# Patient Record
Sex: FEMALE | Race: WHITE | NOT HISPANIC OR LATINO | Employment: FULL TIME | URBAN - METROPOLITAN AREA
[De-identification: names, ages, dates, MRNs, and addresses within clinical notes are randomized per-mention and may not be internally consistent; named-entity substitution may affect disease eponyms.]

---

## 2017-05-05 RX ORDER — ALPRAZOLAM 0.25 MG/1
TABLET ORAL
Qty: 30 TABLET | Refills: 0 | Status: SHIPPED | OUTPATIENT
Start: 2017-05-05 | End: 2017-08-25 | Stop reason: SDUPTHER

## 2017-06-26 RX ORDER — SIMVASTATIN 20 MG
TABLET ORAL
Qty: 30 TABLET | Refills: 1 | Status: SHIPPED | OUTPATIENT
Start: 2017-06-26 | End: 2017-08-21 | Stop reason: SDUPTHER

## 2017-07-11 RX ORDER — VALACYCLOVIR HYDROCHLORIDE 1 G/1
TABLET, FILM COATED ORAL
Qty: 30 TABLET | Refills: 0 | Status: SHIPPED | OUTPATIENT
Start: 2017-07-11 | End: 2018-10-03 | Stop reason: SDUPTHER

## 2017-07-25 ENCOUNTER — TRANSCRIBE ORDERS (OUTPATIENT)
Dept: ADMINISTRATIVE | Facility: HOSPITAL | Age: 57
End: 2017-07-25

## 2017-07-25 DIAGNOSIS — Z12.31 VISIT FOR SCREENING MAMMOGRAM: Primary | ICD-10-CM

## 2017-07-28 ENCOUNTER — HOSPITAL ENCOUNTER (OUTPATIENT)
Dept: MAMMOGRAPHY | Facility: HOSPITAL | Age: 57
Discharge: HOME | End: 2017-07-28
Admitting: INTERNAL MEDICINE

## 2017-07-28 DIAGNOSIS — Z12.31 VISIT FOR SCREENING MAMMOGRAM: ICD-10-CM

## 2017-07-28 PROCEDURE — G0202 SCR MAMMO BI INCL CAD: HCPCS

## 2017-08-18 DIAGNOSIS — Z00.00 HEALTH CARE MAINTENANCE: Primary | ICD-10-CM

## 2017-08-18 DIAGNOSIS — E78.5 HYPERLIPIDEMIA, UNSPECIFIED HYPERLIPIDEMIA TYPE: ICD-10-CM

## 2017-08-19 LAB
25(OH)D3+25(OH)D2 SERPL-MCNC: 40.4 NG/ML (ref 30–100)
APPEARANCE UR: CLEAR
BACTERIA #/AREA URNS HPF: NORMAL /HPF
BASOPHILS # BLD AUTO: 0.03 10*3/MM3 (ref 0–0.2)
BASOPHILS NFR BLD AUTO: 0.5 % (ref 0–1.5)
BILIRUB UR QL STRIP: NEGATIVE
CHOLEST SERPL-MCNC: 223 MG/DL (ref 0–200)
COLOR UR: YELLOW
EOSINOPHIL # BLD AUTO: 0.07 10*3/MM3 (ref 0–0.7)
EOSINOPHIL NFR BLD AUTO: 1.2 % (ref 0.3–6.2)
EPI CELLS #/AREA URNS HPF: NORMAL /HPF
ERYTHROCYTE [DISTWIDTH] IN BLOOD BY AUTOMATED COUNT: 13.1 % (ref 11.7–13)
GLUCOSE UR QL: NEGATIVE
HCT VFR BLD AUTO: 41.2 % (ref 35.6–45.5)
HDLC SERPL-MCNC: 107 MG/DL (ref 40–60)
HGB BLD-MCNC: 13.8 G/DL (ref 11.9–15.5)
HGB UR QL STRIP: NEGATIVE
IMM GRANULOCYTES # BLD: 0.02 10*3/MM3 (ref 0–0.03)
IMM GRANULOCYTES NFR BLD: 0.4 % (ref 0–0.5)
KETONES UR QL STRIP: NEGATIVE
LDLC SERPL CALC-MCNC: 83 MG/DL (ref 0–100)
LEUKOCYTE ESTERASE UR QL STRIP: NEGATIVE
LYMPHOCYTES # BLD AUTO: 2.29 10*3/MM3 (ref 0.9–4.8)
LYMPHOCYTES NFR BLD AUTO: 40.2 % (ref 19.6–45.3)
MCH RBC QN AUTO: 35.1 PG (ref 26.9–32)
MCHC RBC AUTO-ENTMCNC: 33.5 G/DL (ref 32.4–36.3)
MCV RBC AUTO: 104.8 FL (ref 80.5–98.2)
MICRO URNS: NORMAL
MICRO URNS: NORMAL
MONOCYTES # BLD AUTO: 0.48 10*3/MM3 (ref 0.2–1.2)
MONOCYTES NFR BLD AUTO: 8.4 % (ref 5–12)
MUCOUS THREADS URNS QL MICRO: PRESENT /HPF
NEUTROPHILS # BLD AUTO: 2.81 10*3/MM3 (ref 1.9–8.1)
NEUTROPHILS NFR BLD AUTO: 49.3 % (ref 42.7–76)
NITRITE UR QL STRIP: NEGATIVE
PH UR STRIP: 5.5 [PH] (ref 5–7.5)
PLATELET # BLD AUTO: 180 10*3/MM3 (ref 140–500)
PROT UR QL STRIP: NEGATIVE
RBC # BLD AUTO: 3.93 10*6/MM3 (ref 3.9–5.2)
RBC #/AREA URNS HPF: NORMAL /HPF
SP GR UR: 1.02 (ref 1–1.03)
TRIGL SERPL-MCNC: 166 MG/DL (ref 0–150)
TSH SERPL DL<=0.005 MIU/L-ACNC: 2.89 MIU/ML (ref 0.27–4.2)
URINALYSIS REFLEX: NORMAL
UROBILINOGEN UR STRIP-MCNC: 0.2 MG/DL (ref 0.2–1)
VLDLC SERPL CALC-MCNC: 33.2 MG/DL (ref 5–40)
WBC # BLD AUTO: 5.7 10*3/MM3 (ref 4.5–10.7)
WBC #/AREA URNS HPF: NORMAL /HPF

## 2017-08-21 RX ORDER — SIMVASTATIN 20 MG
TABLET ORAL
Qty: 30 TABLET | Refills: 0 | Status: SHIPPED | OUTPATIENT
Start: 2017-08-21 | End: 2017-09-30 | Stop reason: SDUPTHER

## 2017-08-25 ENCOUNTER — OFFICE VISIT (OUTPATIENT)
Dept: INTERNAL MEDICINE | Facility: CLINIC | Age: 57
End: 2017-08-25

## 2017-08-25 VITALS
HEART RATE: 78 BPM | OXYGEN SATURATION: 98 % | SYSTOLIC BLOOD PRESSURE: 130 MMHG | HEIGHT: 64 IN | BODY MASS INDEX: 23.9 KG/M2 | WEIGHT: 140 LBS | DIASTOLIC BLOOD PRESSURE: 84 MMHG

## 2017-08-25 DIAGNOSIS — M53.3 COCCYDYNIA: ICD-10-CM

## 2017-08-25 DIAGNOSIS — E78.5 HYPERLIPIDEMIA, UNSPECIFIED HYPERLIPIDEMIA TYPE: ICD-10-CM

## 2017-08-25 DIAGNOSIS — Z00.00 WELL ADULT EXAM: Primary | ICD-10-CM

## 2017-08-25 LAB
ALBUMIN SERPL-MCNC: 4.8 G/DL (ref 3.5–5.2)
ALBUMIN/GLOB SERPL: 1.8 G/DL
ALP SERPL-CCNC: 62 U/L (ref 39–117)
ALT SERPL-CCNC: 56 U/L (ref 1–33)
AST SERPL-CCNC: 49 U/L (ref 1–32)
BILIRUB SERPL-MCNC: 0.5 MG/DL (ref 0.1–1.2)
BUN SERPL-MCNC: 16 MG/DL (ref 6–20)
BUN/CREAT SERPL: 27.1 (ref 7–25)
CALCIUM SERPL-MCNC: 9.7 MG/DL (ref 8.6–10.5)
CHLORIDE SERPL-SCNC: 100 MMOL/L (ref 98–107)
CO2 SERPL-SCNC: 27.4 MMOL/L (ref 22–29)
CREAT SERPL-MCNC: 0.59 MG/DL (ref 0.57–1)
GLOBULIN SER CALC-MCNC: 2.7 GM/DL
GLUCOSE SERPL-MCNC: 96 MG/DL (ref 65–99)
POTASSIUM SERPL-SCNC: 4.4 MMOL/L (ref 3.5–5.2)
PROT SERPL-MCNC: 7.5 G/DL (ref 6–8.5)
SODIUM SERPL-SCNC: 144 MMOL/L (ref 136–145)

## 2017-08-25 PROCEDURE — 72220 X-RAY EXAM SACRUM TAILBONE: CPT | Performed by: INTERNAL MEDICINE

## 2017-08-25 PROCEDURE — 93000 ELECTROCARDIOGRAM COMPLETE: CPT | Performed by: INTERNAL MEDICINE

## 2017-08-25 PROCEDURE — 99214 OFFICE O/P EST MOD 30 MIN: CPT | Performed by: INTERNAL MEDICINE

## 2017-08-25 RX ORDER — ALPRAZOLAM 0.25 MG/1
0.25 TABLET ORAL DAILY PRN
Qty: 30 TABLET | Refills: 0 | Status: SHIPPED | OUTPATIENT
Start: 2017-08-25 | End: 2017-09-24

## 2017-08-25 NOTE — PROGRESS NOTES
Subjective     Sammi Stein is a 57 y.o. female who presents for a complete physical exam.      History of Present Illness     HLD.  Good control with current regimen.   C/o LBP.  Tailbone area.  Going on 2-3 months. Hurts to sit/stand.  Exercise helps.  Ibuprofen helpful.  No injury.    GERD.  She is followed by Dr. Shafer.    IBS.  Anxiety bothers her GI tract.   Anxiety.  Rare use of Xanax is helpful.      Review of Systems   Constitutional: Negative.    HENT: Negative.    Eyes: Negative.    Respiratory: Negative.    Cardiovascular: Negative.    Gastrointestinal:        Symptoms of IBS   Endocrine: Negative.    Genitourinary: Negative.    Musculoskeletal: Negative.    Skin: Negative.    Allergic/Immunologic: Negative.    Neurological: Negative.    Hematological: Negative.    Psychiatric/Behavioral: The patient is nervous/anxious.        The following portions of the patient's history were reviewed and updated as appropriate: allergies, current medications, past family history, past medical history, past social history, past surgical history and problem list.  Health maintenance tab was reviewed and updated with the patient.       Patient Active Problem List    Diagnosis Date Noted   • Panic attack 01/22/2016     Note Last Updated: 8/16/2016     Description: Rare use of Xanax.  Contract on 8/16/2016     • Irritable bowel 01/22/2016   • HLD (hyperlipidemia) 01/22/2016   • Gastroesophageal reflux disease with esophagitis 01/22/2016       Past Medical History:   Diagnosis Date   • Abnormal EKG     Anterior T wave changes at baseline   • Abnormal liver function test    • Blood tests for routine general physical examination    • Gastritis    • Gastritis    • GERD (gastroesophageal reflux disease)    • Herpes labialis    • Herpes labialis    • Hyperlipidemia    • IBS (irritable bowel syndrome)    • Panic attack     Rare use of Xanax.  Contract on 8/6/14   • SVT (supraventricular tachycardia)     Holter on 8/23/16 with  several brief runs of SVT of up to 7 beats       Past Surgical History:   Procedure Laterality Date   • BLADDER SURGERY     • CHOLECYSTECTOMY     • SALPINGO OOPHORECTOMY     • TOTAL ABDOMINAL HYSTERECTOMY  1992    done for fibroids   • TOTAL ABDOMINAL HYSTERECTOMY WITH SALPINGO OOPHORECTOMY  1992       Family History   Problem Relation Age of Onset   • Diabetes Father    • Heart attack Father      Father with fatal MI at 76   • Diabetes Sister      type 2, two sisters   • Rectal cancer Brother    • Rheumatic fever Brother    • Diabetes Sister    • Stroke Sister    • Breast cancer Maternal Aunt      late 70's       Social History     Social History   • Marital status:      Spouse name: N/A   • Number of children: N/A   • Years of education: N/A     Occupational History   • Not on file.     Social History Main Topics   • Smoking status: Never Smoker   • Smokeless tobacco: Never Used   • Alcohol use Yes      Comment: Socially    • Drug use: No   • Sexual activity: Not on file     Other Topics Concern   • Not on file     Social History Narrative       Current Outpatient Prescriptions on File Prior to Visit   Medication Sig Dispense Refill   • hyoscyamine (ANASPAZ,LEVSIN) 0.125 MG tablet Take by mouth. Take 1 tablet 3-4 times daily as needed     • omeprazole (PriLOSEC) 40 MG capsule Take 1 capsule by mouth Daily.     • simvastatin (ZOCOR) 20 MG tablet TAKE ONE TABLET BY MOUTH DAILY 30 tablet 0   • traZODone (DESYREL) 50 MG tablet TAKE ONE-HALF TO ONE TABLET BY MOUTH EVERY NIGHT AT BEDTIME 90 tablet 10   • valACYclovir (VALTREX) 1000 MG tablet TAKE TWO TABLETS BY MOUTH EVERY 12 HOURS AS NEEDED 30 tablet 0   • [DISCONTINUED] ALPRAZolam (XANAX) 0.25 MG tablet TAKE ONE TABLET BY MOUTH EVERY 6 TO 8 HOURS AS NEEDED 30 tablet 0     No current facility-administered medications on file prior to visit.        Allergies   Allergen Reactions   • Codeine Nausea Only       Immunization History   Administered Date(s)  "Administered   • HPV Quadrivalent 10/02/2006, 12/29/2006   • Tdap 08/16/2016       Objective     /84  Pulse 78  Ht 64\" (162.6 cm)  Wt 140 lb (63.5 kg)  LMP  (Approximate)  SpO2 98%  BMI 24.03 kg/m2    Physical Exam   Constitutional: She is oriented to person, place, and time. She appears well-developed and well-nourished.   HENT:   Head: Normocephalic and atraumatic.   Right Ear: Hearing, tympanic membrane and external ear normal.   Left Ear: Hearing, tympanic membrane and external ear normal.   Nose: Nose normal.   Mouth/Throat: Oropharynx is clear and moist.   Neck: Neck supple. No thyromegaly present.   Cardiovascular: Normal rate, regular rhythm and normal heart sounds.    No murmur heard.  Pulmonary/Chest: Effort normal and breath sounds normal. Right breast exhibits no mass. Left breast exhibits no mass.   Abdominal: Soft. She exhibits no distension. There is no hepatosplenomegaly. There is no tenderness.   Genitourinary: No breast tenderness.   Musculoskeletal:        Lumbar back: She exhibits no tenderness, no bony tenderness, no swelling and no edema.   Lymphadenopathy:     She has no cervical adenopathy.   Neurological: She is alert and oriented to person, place, and time.   Skin: Skin is warm and dry.   Psychiatric: She has a normal mood and affect.         ECG 12 Lead  Date/Time: 8/25/2017 10:36 AM  Performed by: JF ABAD  Authorized by: JF ABAD   Comparison: compared with previous ECG from 8/23/2016  Similar to previous ECG  Rhythm: sinus rhythm  Rate: normal  Conduction: conduction normal  ST Segments: ST segments normal  T flattening: V1, V2, V3 and III  QRS axis: normal  Clinical impression: non-specific ECG           X-rays of the coccyx  performed today for following indication:   pain.  X-ray reveal nad.  There is no available x-ray for comparison.  X-ray sent to radiology for official interpretation and findings.        Assessment/Plan   Sammi was seen today for " annual exam.    Diagnoses and all orders for this visit:    Well adult exam  -     ECG 12 Lead    Hyperlipidemia, unspecified hyperlipidemia type  -     Comprehensive Metabolic Panel    Coccydynia  -     XR Sacrum & Coccyx (In Office)    Other orders  -     ALPRAZolam (XANAX) 0.25 MG tablet; Take 1 tablet by mouth Daily As Needed for Anxiety for up to 30 days.        Discussion    Patient presents today for a CPE.      Patient follows a healthy diet.   Patient follows an adequate exercise regimen. Mammogram is up to date.   Colon cancer screening is up to date.   Pap smear no longer performed secondary to hysterectomy.   Immunizations are up to date.   DEXA is up to date.    Anxiety.  Rx rare use of Xanax.  Update contract.  Offered Lexapro.    HLD.  Continue current regimen.    Coccydynia.  I recommend to read [facility] article for tips on conservative management.  Let me know if continued bother.      Health Maintenance   Topic Date Due   • INFLUENZA VACCINE  09/01/2017   • LIPID PANEL  08/18/2018   • MAMMOGRAM  07/28/2019   • COLONOSCOPY  03/26/2020   • TDAP/TD VACCINES (2 - Td) 08/16/2026   • HEPATITIS C SCREENING  Addressed   • PAP SMEAR  Excluded            Future Appointments  Date Time Provider Department Center   8/24/2018 9:00 AM [facility] None   8/31/2018 10:00 AM Christine Morris MD [facility] None

## 2017-09-26 RX ORDER — TRAZODONE HYDROCHLORIDE 50 MG/1
TABLET ORAL
Qty: 30 TABLET | Refills: 9 | Status: SHIPPED | OUTPATIENT
Start: 2017-09-26 | End: 2018-10-27 | Stop reason: SDUPTHER

## 2017-10-02 RX ORDER — SIMVASTATIN 20 MG
TABLET ORAL
Qty: 30 TABLET | Refills: 3 | Status: SHIPPED | OUTPATIENT
Start: 2017-10-02 | End: 2018-02-04 | Stop reason: SDUPTHER

## 2018-02-05 RX ORDER — SIMVASTATIN 20 MG
TABLET ORAL
Qty: 30 TABLET | Refills: 2 | Status: SHIPPED | OUTPATIENT
Start: 2018-02-05 | End: 2018-05-26 | Stop reason: SDUPTHER

## 2018-03-06 ENCOUNTER — OFFICE (OUTPATIENT)
Dept: URBAN - METROPOLITAN AREA OTHER 6 | Facility: OTHER | Age: 58
End: 2018-03-06

## 2018-03-06 VITALS
HEIGHT: 64 IN | WEIGHT: 146 LBS | SYSTOLIC BLOOD PRESSURE: 130 MMHG | HEART RATE: 72 BPM | DIASTOLIC BLOOD PRESSURE: 84 MMHG

## 2018-03-06 DIAGNOSIS — K58.9 IRRITABLE BOWEL SYNDROME WITHOUT DIARRHEA: ICD-10-CM

## 2018-03-06 DIAGNOSIS — K21.9 GASTRO-ESOPHAGEAL REFLUX DISEASE WITHOUT ESOPHAGITIS: ICD-10-CM

## 2018-03-06 DIAGNOSIS — Z80.0 FAMILY HISTORY OF MALIGNANT NEOPLASM OF DIGESTIVE ORGANS: ICD-10-CM

## 2018-03-06 PROCEDURE — 99212 OFFICE O/P EST SF 10 MIN: CPT | Performed by: INTERNAL MEDICINE

## 2018-05-29 RX ORDER — SIMVASTATIN 20 MG
TABLET ORAL
Qty: 30 TABLET | Refills: 1 | Status: SHIPPED | OUTPATIENT
Start: 2018-05-29 | End: 2018-08-04 | Stop reason: SDUPTHER

## 2018-06-05 RX ORDER — ALPRAZOLAM 0.25 MG/1
TABLET ORAL
Qty: 30 TABLET | Refills: 0 | Status: SHIPPED | OUTPATIENT
Start: 2018-06-05 | End: 2018-09-10 | Stop reason: SDUPTHER

## 2018-08-04 RX ORDER — SIMVASTATIN 20 MG
TABLET ORAL
Qty: 30 TABLET | Refills: 0 | Status: SHIPPED | OUTPATIENT
Start: 2018-08-04 | End: 2018-09-07 | Stop reason: SDUPTHER

## 2018-08-06 ENCOUNTER — TRANSCRIBE ORDERS (OUTPATIENT)
Dept: ADMINISTRATIVE | Facility: HOSPITAL | Age: 58
End: 2018-08-06

## 2018-08-06 DIAGNOSIS — Z12.31 VISIT FOR SCREENING MAMMOGRAM: Primary | ICD-10-CM

## 2018-08-19 ENCOUNTER — OFFICE VISIT (OUTPATIENT)
Dept: RETAIL CLINIC | Facility: CLINIC | Age: 58
End: 2018-08-19

## 2018-08-19 VITALS
HEART RATE: 81 BPM | OXYGEN SATURATION: 98 % | SYSTOLIC BLOOD PRESSURE: 144 MMHG | DIASTOLIC BLOOD PRESSURE: 80 MMHG | TEMPERATURE: 98.3 F | RESPIRATION RATE: 20 BRPM
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
## 2018-08-19 PROCEDURE — 99213 OFFICE O/P EST LOW 20 MIN: CPT | Performed by: NURSE PRACTITIONER

## 2018-08-19 RX ORDER — TRIAMCINOLONE ACETONIDE 1 MG/G
CREAM TOPICAL 2 TIMES DAILY
Qty: 60 G | Refills: 0 | Status: SHIPPED | OUTPATIENT
Start: 2018-08-19 | End: 2018-08-29

## 2018-08-19 NOTE — PATIENT INSTRUCTIONS
Insect Bite, Adult  An insect bite can make your skin red, itchy, and swollen. An insect bite is different from an insect sting, which happens when an insect injects poison (venom) into the skin.  Some insects can spread disease to people through a bite. However, most insect bites do not lead to disease and are not serious.  What are the causes?  Insects may bite for a variety of reasons, including:  · Hunger.  · To defend themselves.    Insects that bite include:  · Spiders.  · Mosquitoes.  · Ticks.  · Fleas.  · Ants.  · Flies.  · Bedbugs.    What are the signs or symptoms?  Symptoms of this condition include:  · Itching or pain in the bite area.  · Redness and swelling in the bite area.  · An open wound (skin ulcer).    In many cases, symptoms last for 2-4 days.  How is this diagnosed?  This condition is usually diagnosed based on symptoms and a physical exam.  How is this treated?  Treatment is usually not needed. Symptoms often go away on their own. When treatment is recommended, it may involve:  · Applying a cream or lotion to the bitten area. This treatment helps with itching.  · Taking an antibiotic medicine. This treatment is needed if the bite area gets infected.  · Getting a tetanus shot.  · Applying ice to the affected area.  · Medicines called antihistamines. This treatment is needed if you develop an allergic reaction to the insect bite.    Follow these instructions at home:  Bite area care  · Do not scratch the bite area.  · Keep the bite area clean and dry. Wash it every day with soap and water as told by your health care provider.  · Check the bite area every day for signs of infection. Check for:  ? More redness, swelling, or pain.  ? Fluid or blood.  ? Warmth.  ? Pus.  Managing pain, itching, and swelling    · You may apply a baking soda paste, cortisone cream, or calamine lotion to the bite area as told by your health care provider.  · If directed, apply ice to the bite area.  ? Put ice in a  plastic bag.  ? Place a towel between your skin and the bag.  ? Leave the ice on for 20 minutes, 2-3 times per day.  Medicines  · Apply or take over-the-counter and prescription medicines only as told by your health care provider.  · If you were prescribed an antibiotic medicine, use it as told by your health care provider. Do not stop using the antibiotic even if your condition improves.  General instructions  · Keep all follow-up visits as told by your health care provider. This is important.  How is this prevented?  To help reduce your risk of insect bites:  · When you are outdoors, wear clothing that covers your arms and legs.  · Use insect repellent. The best insect repellents contain:  ? DEET, picaridin, oil of lemon eucalyptus (OLE), or RT4242.  ? Higher amounts of an active ingredient.  · If your home windows do not have screens, consider installing them.    Contact a health care provider if:  · You have more redness, swelling, or pain in the bite area.  · You have fluid, blood, or pus coming from the bite area.  · The bite area feels warm to the touch.  · You have a fever.  Get help right away if:  · You have joint pain.  · You have a rash.  · You have shortness of breath.  · You feel unusually tired or sleepy.  · You have neck pain.  · You have a headache.  · You have unusual weakness.  · You have chest pain.  · You have nausea, vomiting, or pain in the abdomen.  This information is not intended to replace advice given to you by your health care provider. Make sure you discuss any questions you have with your health care provider.  Document Released: 01/25/2006 Document Revised: 08/16/2017 Document Reviewed: 06/26/2017  "[employer]" Interactive Patient Education © 2018 [employer]

## 2018-08-19 NOTE — PROGRESS NOTES
Subjective   Sammi Stein is a 58 y.o. female.     Insect Bite   This is a new problem. Episode onset: 4 days ago. The problem has been unchanged. Associated symptoms include a rash. Pertinent negatives include no change in bowel habit, chest pain, chills, coughing, diaphoresis, fatigue, fever, headaches, myalgias, nausea, swollen glands, vertigo, visual change, vomiting or weakness. Nothing aggravates the symptoms. Treatments tried: cortisone cream. The treatment provided mild relief.       The following portions of the patient's history were reviewed and updated as appropriate: allergies, current medications, past medical history, past social history, past surgical history and problem list.    Review of Systems   Constitutional: Negative for chills, diaphoresis, fatigue and fever.   HENT: Negative for trouble swallowing.    Eyes: Negative for redness, itching and visual disturbance.   Respiratory: Negative for cough, choking, chest tightness, shortness of breath, wheezing and stridor.    Cardiovascular: Negative for chest pain.   Gastrointestinal: Negative for change in bowel habit, diarrhea, nausea and vomiting.   Musculoskeletal: Negative for myalgias.   Skin: Positive for rash.   Allergic/Immunologic: Positive for environmental allergies. Negative for food allergies and immunocompromised state.   Neurological: Negative for dizziness, vertigo, weakness and headaches.       Objective   Physical Exam   Constitutional: She is oriented to person, place, and time. She appears well-developed and well-nourished. She is cooperative. She does not appear ill. No distress.   Cardiovascular: Normal rate, regular rhythm, S1 normal and S2 normal.    Pulmonary/Chest: Effort normal and breath sounds normal.   Neurological: She is alert and oriented to person, place, and time.   Skin: Rash noted. Rash is papular. She is not diaphoretic.        Vitals reviewed.      Assessment/Plan   Sammi was seen today for insect  bite.    Diagnoses and all orders for this visit:    Insect bite, initial encounter  -     triamcinolone (KENALOG) 0.1 % cream; Apply  topically to the appropriate area as directed 2 (Two) Times a Day for 10 days.          -     Follow up with PCP for persistent symptoms or UC/ER for worsening/new symptoms

## 2018-08-21 ENCOUNTER — APPOINTMENT (OUTPATIENT)
Dept: MAMMOGRAPHY | Facility: HOSPITAL | Age: 58
End: 2018-08-21

## 2018-08-22 DIAGNOSIS — Z00.00 LABORATORY TESTS ORDERED AS PART OF A COMPLETE PHYSICAL EXAM (CPE): Primary | ICD-10-CM

## 2018-08-24 LAB
ALBUMIN SERPL-MCNC: 4.7 G/DL (ref 3.5–5.2)
ALBUMIN/GLOB SERPL: 1.7 G/DL
ALP SERPL-CCNC: 66 U/L (ref 39–117)
ALT SERPL-CCNC: 34 U/L (ref 1–33)
APPEARANCE UR: CLEAR
AST SERPL-CCNC: 32 U/L (ref 1–32)
BACTERIA #/AREA URNS HPF: ABNORMAL /HPF
BASOPHILS # BLD AUTO: 0.03 10*3/MM3 (ref 0–0.2)
BASOPHILS NFR BLD AUTO: 0.5 % (ref 0–1.5)
BILIRUB SERPL-MCNC: 0.5 MG/DL (ref 0.1–1.2)
BILIRUB UR QL STRIP: NEGATIVE
BUN SERPL-MCNC: 12 MG/DL (ref 8–23)
BUN/CREAT SERPL: 18.8 (ref 7–25)
CALCIUM SERPL-MCNC: 9.9 MG/DL (ref 8.6–10.5)
CASTS URNS MICRO: ABNORMAL
CHLORIDE SERPL-SCNC: 101 MMOL/L (ref 98–107)
CHOLEST SERPL-MCNC: 203 MG/DL (ref 0–200)
CO2 SERPL-SCNC: 28 MMOL/L (ref 22–29)
COLOR UR: YELLOW
CREAT SERPL-MCNC: 0.64 MG/DL (ref 0.57–1)
EOSINOPHIL # BLD AUTO: 0.06 10*3/MM3 (ref 0–0.7)
EOSINOPHIL NFR BLD AUTO: 1 % (ref 0.3–6.2)
EPI CELLS #/AREA URNS HPF: ABNORMAL /HPF
ERYTHROCYTE [DISTWIDTH] IN BLOOD BY AUTOMATED COUNT: 12.4 % (ref 11.7–13)
GLOBULIN SER CALC-MCNC: 2.8 GM/DL
GLUCOSE SERPL-MCNC: 94 MG/DL (ref 65–99)
GLUCOSE UR QL: NEGATIVE
HCT VFR BLD AUTO: 36.9 % (ref 35.6–45.5)
HDLC SERPL-MCNC: 97 MG/DL (ref 40–60)
HGB BLD-MCNC: 13.2 G/DL (ref 11.9–15.5)
HGB UR QL STRIP: NEGATIVE
IMM GRANULOCYTES # BLD: 0.01 10*3/MM3 (ref 0–0.03)
IMM GRANULOCYTES NFR BLD: 0.2 % (ref 0–0.5)
KETONES UR QL STRIP: NEGATIVE
LDLC SERPL CALC-MCNC: 86 MG/DL (ref 0–100)
LEUKOCYTE ESTERASE UR QL STRIP: (no result)
LYMPHOCYTES # BLD AUTO: 2.62 10*3/MM3 (ref 0.9–4.8)
LYMPHOCYTES NFR BLD AUTO: 44 % (ref 19.6–45.3)
MCH RBC QN AUTO: 36.3 PG (ref 26.9–32)
MCHC RBC AUTO-ENTMCNC: 35.8 G/DL (ref 32.4–36.3)
MCV RBC AUTO: 101.4 FL (ref 80.5–98.2)
MONOCYTES # BLD AUTO: 0.4 10*3/MM3 (ref 0.2–1.2)
MONOCYTES NFR BLD AUTO: 6.7 % (ref 5–12)
NEUTROPHILS # BLD AUTO: 2.85 10*3/MM3 (ref 1.9–8.1)
NEUTROPHILS NFR BLD AUTO: 47.8 % (ref 42.7–76)
NITRITE UR QL STRIP: NEGATIVE
PH UR STRIP: 5.5 [PH] (ref 5–8)
PLATELET # BLD AUTO: 192 10*3/MM3 (ref 140–500)
POTASSIUM SERPL-SCNC: 4.1 MMOL/L (ref 3.5–5.2)
PROT SERPL-MCNC: 7.5 G/DL (ref 6–8.5)
PROT UR QL STRIP: NEGATIVE
RBC # BLD AUTO: 3.64 10*6/MM3 (ref 3.9–5.2)
RBC #/AREA URNS HPF: ABNORMAL /HPF
SODIUM SERPL-SCNC: 144 MMOL/L (ref 136–145)
SP GR UR: 1.02 (ref 1–1.03)
TRIGL SERPL-MCNC: 102 MG/DL (ref 0–150)
TSH SERPL DL<=0.005 MIU/L-ACNC: 2.23 MIU/ML (ref 0.27–4.2)
UROBILINOGEN UR STRIP-MCNC: (no result) MG/DL
VLDLC SERPL CALC-MCNC: 20.4 MG/DL (ref 5–40)
WBC # BLD AUTO: 5.96 10*3/MM3 (ref 4.5–10.7)
WBC #/AREA URNS HPF: ABNORMAL /HPF

## 2018-08-31 ENCOUNTER — OFFICE VISIT (OUTPATIENT)
Dept: INTERNAL MEDICINE | Facility: CLINIC | Age: 58
End: 2018-08-31

## 2018-08-31 VITALS
BODY MASS INDEX: 23.9 KG/M2 | HEIGHT: 64 IN | OXYGEN SATURATION: 99 % | SYSTOLIC BLOOD PRESSURE: 140 MMHG | WEIGHT: 140 LBS | HEART RATE: 76 BPM | DIASTOLIC BLOOD PRESSURE: 90 MMHG

## 2018-08-31 DIAGNOSIS — Z00.00 WELL ADULT EXAM: Primary | ICD-10-CM

## 2018-08-31 PROCEDURE — 99396 PREV VISIT EST AGE 40-64: CPT | Performed by: INTERNAL MEDICINE

## 2018-08-31 PROCEDURE — 90632 HEPA VACCINE ADULT IM: CPT | Performed by: INTERNAL MEDICINE

## 2018-08-31 PROCEDURE — 90471 IMMUNIZATION ADMIN: CPT | Performed by: INTERNAL MEDICINE

## 2018-09-05 ENCOUNTER — APPOINTMENT (OUTPATIENT)
Dept: MAMMOGRAPHY | Facility: HOSPITAL | Age: 58
End: 2018-09-05

## 2018-09-10 RX ORDER — ALPRAZOLAM 0.25 MG/1
TABLET ORAL
Qty: 30 TABLET | Refills: 0 | Status: SHIPPED | OUTPATIENT
Start: 2018-09-10 | End: 2018-12-27 | Stop reason: SDUPTHER

## 2018-09-10 RX ORDER — SIMVASTATIN 20 MG
TABLET ORAL
Qty: 30 TABLET | Refills: 0 | Status: SHIPPED | OUTPATIENT
Start: 2018-09-10 | End: 2018-10-09 | Stop reason: SDUPTHER

## 2018-09-12 ENCOUNTER — HOSPITAL ENCOUNTER (OUTPATIENT)
Dept: MAMMOGRAPHY | Facility: HOSPITAL | Age: 58
Discharge: HOME | End: 2018-09-12
Admitting: INTERNAL MEDICINE

## 2018-09-12 DIAGNOSIS — Z12.31 VISIT FOR SCREENING MAMMOGRAM: ICD-10-CM

## 2018-09-12 PROCEDURE — 77067 SCR MAMMO BI INCL CAD: CPT

## 2018-09-12 PROCEDURE — 77063 BREAST TOMOSYNTHESIS BI: CPT

## 2018-10-03 RX ORDER — VALACYCLOVIR HYDROCHLORIDE 1 G/1
TABLET, FILM COATED ORAL
Qty: 30 TABLET | Refills: 0 | Status: SHIPPED | OUTPATIENT
Start: 2018-10-03 | End: 2018-10-08 | Stop reason: SDUPTHER

## 2018-10-08 RX ORDER — VALACYCLOVIR HYDROCHLORIDE 1 G/1
TABLET, FILM COATED ORAL
Qty: 30 TABLET | Refills: 0 | Status: SHIPPED | OUTPATIENT
Start: 2018-10-08 | End: 2018-10-29 | Stop reason: SDUPTHER

## 2018-10-10 RX ORDER — SIMVASTATIN 20 MG
TABLET ORAL
Qty: 30 TABLET | Refills: 0 | Status: SHIPPED | OUTPATIENT
Start: 2018-10-10 | End: 2018-10-29 | Stop reason: SDUPTHER

## 2018-10-29 RX ORDER — SIMVASTATIN 20 MG
20 TABLET ORAL DAILY
Qty: 90 TABLET | Refills: 2 | Status: SHIPPED | OUTPATIENT
Start: 2018-10-29 | End: 2019-09-07 | Stop reason: SDUPTHER

## 2018-10-29 RX ORDER — TRAZODONE HYDROCHLORIDE 50 MG/1
TABLET ORAL
Qty: 30 TABLET | Refills: 8 | Status: SHIPPED | OUTPATIENT
Start: 2018-10-29 | End: 2019-07-06 | Stop reason: SDUPTHER

## 2018-10-29 RX ORDER — VALACYCLOVIR HYDROCHLORIDE 1 G/1
1000 TABLET, FILM COATED ORAL 2 TIMES DAILY
Qty: 90 TABLET | Refills: 2 | Status: SHIPPED | OUTPATIENT
Start: 2018-10-29 | End: 2020-01-21

## 2018-12-28 RX ORDER — ALPRAZOLAM 0.25 MG/1
TABLET ORAL
Qty: 30 TABLET | Refills: 0 | Status: SHIPPED | OUTPATIENT
Start: 2018-12-28 | End: 2019-04-21 | Stop reason: SDUPTHER

## 2019-01-08 ENCOUNTER — OFFICE VISIT (OUTPATIENT)
Dept: RETAIL CLINIC | Facility: CLINIC | Age: 59
End: 2019-01-08

## 2019-01-08 VITALS
OXYGEN SATURATION: 98 % | TEMPERATURE: 98 F | SYSTOLIC BLOOD PRESSURE: 150 MMHG | DIASTOLIC BLOOD PRESSURE: 82 MMHG | HEART RATE: 84 BPM

## 2019-01-08 DIAGNOSIS — H65.113 ACUTE MUCOID OTITIS MEDIA OF BOTH EARS: Primary | ICD-10-CM

## 2019-01-08 DIAGNOSIS — J01.90 ACUTE SINUSITIS, RECURRENCE NOT SPECIFIED, UNSPECIFIED LOCATION: ICD-10-CM

## 2019-01-08 PROCEDURE — 99213 OFFICE O/P EST LOW 20 MIN: CPT | Performed by: NURSE PRACTITIONER

## 2019-01-08 RX ORDER — AMOXICILLIN 875 MG/1
875 TABLET, COATED ORAL 2 TIMES DAILY
Qty: 20 TABLET | Refills: 0 | Status: SHIPPED | OUTPATIENT
Start: 2019-01-08 | End: 2019-01-18

## 2019-01-08 RX ORDER — AZELASTINE HCL 205.5 UG/1
1 SPRAY NASAL 2 TIMES DAILY PRN
Qty: 1 EACH | Refills: 0 | Status: SHIPPED | OUTPATIENT
Start: 2019-01-08 | End: 2019-12-10

## 2019-01-08 RX ORDER — BENZONATATE 100 MG/1
CAPSULE ORAL
Qty: 30 CAPSULE | Refills: 0 | Status: SHIPPED | OUTPATIENT
Start: 2019-01-08 | End: 2019-07-09

## 2019-01-08 NOTE — PROGRESS NOTES
Estuardo Stein is a 58 y.o.. female.     URI    This is a new problem. Episode onset: 11 days; worsening on 1/1/19. The problem has been unchanged. Associated symptoms include congestion, coughing, diarrhea, nausea and rhinorrhea. Pertinent negatives include no abdominal pain, ear pain, headaches, sore throat or vomiting. Treatments tried: dayquil, halls cough drops. The treatment provided no relief.       The following portions of the patient's history were reviewed and updated as appropriate: allergies, current medications, past medical history, past social history and past surgical history.    Review of Systems   Constitutional: Positive for fever (feels warm).   HENT: Positive for congestion and rhinorrhea. Negative for ear pain and sore throat.    Respiratory: Positive for cough.    Gastrointestinal: Positive for diarrhea and nausea. Negative for abdominal pain and vomiting.   Musculoskeletal: Positive for arthralgias.   Neurological: Negative for headaches.       Objective     Vitals:    01/08/19 1236   BP: 150/82   Pulse: 84   Temp: 98 °F (36.7 °C)   TempSrc: Oral   SpO2: 98%       Physical Exam   Constitutional: She is oriented to person, place, and time. She appears well-developed and well-nourished.   HENT:   Head: Normocephalic and atraumatic.   Right Ear: Tympanic membrane is erythematous. A middle ear effusion (haziness) is present.   Left Ear: Tympanic membrane is not erythematous. A middle ear effusion (haziness) is present.   Nose: Mucosal edema present.   Mouth/Throat: Oropharyngeal exudate: pnd.   Eyes: Conjunctivae are normal. Pupils are equal, round, and reactive to light.   Cardiovascular: Normal rate and regular rhythm.   No murmur heard.  Pulmonary/Chest: Effort normal. No accessory muscle usage or stridor. No respiratory distress. She has no decreased breath sounds. She has no wheezes. She has no rhonchi. She has no rales.   Musculoskeletal: Normal range of motion.    Lymphadenopathy:     She has no cervical adenopathy (shotty).   Neurological: She is alert and oriented to person, place, and time.   Skin: Skin is warm and dry.   Vitals reviewed.      Assessment/Plan   Sammi was seen today for uri.    Diagnoses and all orders for this visit:    Acute mucoid otitis media of both ears  -     amoxicillin (AMOXIL) 875 MG tablet; Take 1 tablet by mouth 2 (Two) Times a Day for 10 days.    Acute sinusitis, recurrence not specified, unspecified location  -     benzonatate (TESSALON PERLES) 100 MG capsule; 1 capsule 3 times a day as needed for coughing for 10 days  -     azelastine (ASTEPRO) 0.15 % solution nasal spray; 1 spray into the nostril(s) as directed by provider 2 (Two) Times a Day As Needed for Rhinitis.        Patient Instructions   Sinusitis, Adult  Sinusitis is soreness and inflammation of your sinuses. Sinuses are hollow spaces in the bones around your face. Your sinuses are located:  · Around your eyes.  · In the middle of your forehead.  · Behind your nose.  · In your cheekbones.    Your sinuses and nasal passages are lined with a stringy fluid (mucus). Mucus normally drains out of your sinuses. When your nasal tissues become inflamed or swollen, the mucus can become trapped or blocked so air cannot flow through your sinuses. This allows bacteria, viruses, and funguses to grow, which leads to infection.  Sinusitis can develop quickly and last for 7?10 days (acute) or for more than 12 weeks (chronic). Sinusitis often develops after a cold.  What are the causes?  This condition is caused by anything that creates swelling in the sinuses or stops mucus from draining, including:  · Allergies.  · Asthma.  · Bacterial or viral infection.  · Abnormally shaped bones between the nasal passages.  · Nasal growths that contain mucus (nasal polyps).  · Narrow sinus openings.  · Pollutants, such as chemicals or irritants in the air.  · A foreign object stuck in the nose.  · A fungal  infection. This is rare.    What increases the risk?  The following factors may make you more likely to develop this condition:  · Having allergies or asthma.  · Having had a recent cold or respiratory tract infection.  · Having structural deformities or blockages in your nose or sinuses.  · Having a weak immune system.  · Doing a lot of swimming or diving.  · Overusing nasal sprays.  · Smoking.    What are the signs or symptoms?  The main symptoms of this condition are pain and a feeling of pressure around the affected sinuses. Other symptoms include:  · Upper toothache.  · Earache.  · Headache.  · Bad breath.  · Decreased sense of smell and taste.  · A cough that may get worse at night.  · Fatigue.  · Fever.  · Thick drainage from your nose. The drainage is often green and it may contain pus (purulent).  · Stuffy nose or congestion.  · Postnasal drip. This is when extra mucus collects in the throat or back of the nose.  · Swelling and warmth over the affected sinuses.  · Sore throat.  · Sensitivity to light.    How is this diagnosed?  This condition is diagnosed based on symptoms, a medical history, and a physical exam. To find out if your condition is acute or chronic, your health care provider may:  · Look in your nose for signs of nasal polyps.  · Tap over the affected sinus to check for signs of infection.  · View the inside of your sinuses using an imaging device that has a light attached (endoscope).    If your health care provider suspects that you have chronic sinusitis, you may also:  · Be tested for allergies.  · Have a sample of mucus taken from your nose (nasal culture) and checked for bacteria.  · Have a mucus sample examined to see if your sinusitis is related to an allergy.    If your sinusitis does not respond to treatment and it lasts longer than 8 weeks, you may have an MRI or CT scan to check your sinuses. These scans also help to determine how severe your infection is.  In rare cases, a bone  biopsy may be done to rule out more serious types of fungal sinus disease.  How is this treated?  Treatment for sinusitis depends on the cause and whether your condition is chronic or acute. If a virus is causing your sinusitis, your symptoms will go away on their own within 10 days. You may be given medicines to relieve your symptoms, including:  · Topical nasal decongestants. They shrink swollen nasal passages and let mucus drain from your sinuses.  · Antihistamines. These drugs block inflammation that is triggered by allergies. This can help to ease swelling in your nose and sinuses.  · Topical nasal corticosteroids. These are nasal sprays that ease inflammation and swelling in your nose and sinuses.  · Nasal saline washes. These rinses can help to get rid of thick mucus in your nose.    If your condition is caused by bacteria, you will be given an antibiotic medicine. If your condition is caused by a fungus, you will be given an antifungal medicine.  Surgery may be needed to correct underlying conditions, such as narrow nasal passages. Surgery may also be needed to remove polyps.  Follow these instructions at home:  Medicines  · Take, use, or apply over-the-counter and prescription medicines only as told by your health care provider. These may include nasal sprays.  · If you were prescribed an antibiotic medicine, take it as told by your health care provider. Do not stop taking the antibiotic even if you start to feel better.  Hydrate and Humidify  · Drink enough water to keep your urine clear or pale yellow. Staying hydrated will help to thin your mucus.  · Use a cool mist humidifier to keep the humidity level in your home above 50%.  · Inhale steam for 10-15 minutes, 3-4 times a day or as told by your health care provider. You can do this in the bathroom while a hot shower is running.  · Limit your exposure to cool or dry air.  Rest  · Rest as much as possible.  · Sleep with your head raised  (elevated).  · Make sure to get enough sleep each night.  General instructions  · Apply a warm, moist washcloth to your face 3-4 times a day or as told by your health care provider. This will help with discomfort.  · Wash your hands often with soap and water to reduce your exposure to viruses and other germs. If soap and water are not available, use hand .  · Do not smoke. Avoid being around people who are smoking (secondhand smoke).  · Keep all follow-up visits as told by your health care provider. This is important.  Contact a health care provider if:  · You have a fever.  · Your symptoms get worse.  · Your symptoms do not improve within 10 days.  Get help right away if:  · You have a severe headache.  · You have persistent vomiting.  · You have pain or swelling around your face or eyes.  · You have vision problems.  · You develop confusion.  · Your neck is stiff.  · You have trouble breathing.  This information is not intended to replace advice given to you by your health care provider. Make sure you discuss any questions you have with your health care provider.  Document Released: 12/18/2006 Document Revised: 08/13/2017 Document Reviewed: 10/12/2016  [employer] Patient Education © 2018 [employer]  Otitis Media, Adult  Otitis media occurs when there is inflammation and fluid in the middle ear. Your middle ear is a part of the ear that contains bones for hearing as well as air that helps send sounds to your brain.  What are the causes?  This condition is caused by a blockage in the eustachian tube. This tube drains fluid from the ear to the back of the nose (nasopharynx). A blockage in this tube can be caused by an object or by swelling (edema) in the tube. Problems that can cause a blockage include:  · A cold or other upper respiratory infection.  · Allergies.  · An irritant, such as tobacco smoke.  · Enlarged adenoids. The adenoids are areas of soft tissue located high in the back of the  throat, behind the nose and the roof of the mouth.  · A mass in the nasopharynx.  · Damage to the ear caused by pressure changes (barotrauma).    What are the signs or symptoms?  Symptoms of this condition include:  · Ear pain.  · A fever.  · Decreased hearing.  · A headache.  · Tiredness (lethargy).  · Fluid leaking from the ear.  · Ringing in the ear.    How is this diagnosed?  This condition is diagnosed with a physical exam. During the exam your health care provider will use an instrument called an otoscope to look into your ear and check for redness, swelling, and fluid. He or she will also ask about your symptoms.  Your health care provider may also order tests, such as:  · A test to check the movement of the eardrum (pneumatic otoscopy). This test is done by squeezing a small amount of air into the ear.  · A test that changes air pressure in the middle ear to check how well the eardrum moves and whether the eustachian tube is working (tympanogram).    How is this treated?  This condition usually goes away on its own within 3-5 days. But if the condition is caused by a bacteria infection and does not go away own its own, or keeps coming back, your health care provider may:  · Prescribe antibiotic medicines to treat the infection.  · Prescribe or recommend medicines to control pain.    Follow these instructions at home:  · Take over-the-counter and prescription medicines only as told by your health care provider.  · If you were prescribed an antibiotic medicine, take it as told by your health care provider. Do not stop taking the antibiotic even if you start to feel better.  · Keep all follow-up visits as told by your health care provider. This is important.  Contact a health care provider if:  · You have bleeding from your nose.  · There is a lump on your neck.  · You are not getting better in 5 days.  · You feel worse instead of better.  Get help right away if:  · You have severe pain that is not controlled  with medicine.  · You have swelling, redness, or pain around your ear.  · You have stiffness in your neck.  · A part of your face is paralyzed.  · The bone behind your ear (mastoid) is tender when you touch it.  · You develop a severe headache.  Summary  · Otitis media is redness, soreness, and swelling of the middle ear.  · This condition usually goes away on its own within 3-5 days.  · If the problem does not go away in 3-5 days, your health care provider may prescribe or recommend medicines to treat your symptoms.  · If you were prescribed an antibiotic medicine, take it as told by your health care provider.  This information is not intended to replace advice given to you by your health care provider. Make sure you discuss any questions you have with your health care provider.  Document Released: 09/22/2005 Document Revised: 12/08/2017 Document Reviewed: 12/08/2017  [employer] Interactive Patient Education © 2018 [employer]        Return if symptoms worsen or fail to improve, for follow up with primary care provider as needed.

## 2019-01-08 NOTE — PATIENT INSTRUCTIONS
Sinusitis, Adult  Sinusitis is soreness and inflammation of your sinuses. Sinuses are hollow spaces in the bones around your face. Your sinuses are located:  · Around your eyes.  · In the middle of your forehead.  · Behind your nose.  · In your cheekbones.    Your sinuses and nasal passages are lined with a stringy fluid (mucus). Mucus normally drains out of your sinuses. When your nasal tissues become inflamed or swollen, the mucus can become trapped or blocked so air cannot flow through your sinuses. This allows bacteria, viruses, and funguses to grow, which leads to infection.  Sinusitis can develop quickly and last for 7?10 days (acute) or for more than 12 weeks (chronic). Sinusitis often develops after a cold.  What are the causes?  This condition is caused by anything that creates swelling in the sinuses or stops mucus from draining, including:  · Allergies.  · Asthma.  · Bacterial or viral infection.  · Abnormally shaped bones between the nasal passages.  · Nasal growths that contain mucus (nasal polyps).  · Narrow sinus openings.  · Pollutants, such as chemicals or irritants in the air.  · A foreign object stuck in the nose.  · A fungal infection. This is rare.    What increases the risk?  The following factors may make you more likely to develop this condition:  · Having allergies or asthma.  · Having had a recent cold or respiratory tract infection.  · Having structural deformities or blockages in your nose or sinuses.  · Having a weak immune system.  · Doing a lot of swimming or diving.  · Overusing nasal sprays.  · Smoking.    What are the signs or symptoms?  The main symptoms of this condition are pain and a feeling of pressure around the affected sinuses. Other symptoms include:  · Upper toothache.  · Earache.  · Headache.  · Bad breath.  · Decreased sense of smell and taste.  · A cough that may get worse at night.  · Fatigue.  · Fever.  · Thick drainage from your nose. The drainage is often green and  it may contain pus (purulent).  · Stuffy nose or congestion.  · Postnasal drip. This is when extra mucus collects in the throat or back of the nose.  · Swelling and warmth over the affected sinuses.  · Sore throat.  · Sensitivity to light.    How is this diagnosed?  This condition is diagnosed based on symptoms, a medical history, and a physical exam. To find out if your condition is acute or chronic, your health care provider may:  · Look in your nose for signs of nasal polyps.  · Tap over the affected sinus to check for signs of infection.  · View the inside of your sinuses using an imaging device that has a light attached (endoscope).    If your health care provider suspects that you have chronic sinusitis, you may also:  · Be tested for allergies.  · Have a sample of mucus taken from your nose (nasal culture) and checked for bacteria.  · Have a mucus sample examined to see if your sinusitis is related to an allergy.    If your sinusitis does not respond to treatment and it lasts longer than 8 weeks, you may have an MRI or CT scan to check your sinuses. These scans also help to determine how severe your infection is.  In rare cases, a bone biopsy may be done to rule out more serious types of fungal sinus disease.  How is this treated?  Treatment for sinusitis depends on the cause and whether your condition is chronic or acute. If a virus is causing your sinusitis, your symptoms will go away on their own within 10 days. You may be given medicines to relieve your symptoms, including:  · Topical nasal decongestants. They shrink swollen nasal passages and let mucus drain from your sinuses.  · Antihistamines. These drugs block inflammation that is triggered by allergies. This can help to ease swelling in your nose and sinuses.  · Topical nasal corticosteroids. These are nasal sprays that ease inflammation and swelling in your nose and sinuses.  · Nasal saline washes. These rinses can help to get rid of thick mucus in  your nose.    If your condition is caused by bacteria, you will be given an antibiotic medicine. If your condition is caused by a fungus, you will be given an antifungal medicine.  Surgery may be needed to correct underlying conditions, such as narrow nasal passages. Surgery may also be needed to remove polyps.  Follow these instructions at home:  Medicines  · Take, use, or apply over-the-counter and prescription medicines only as told by your health care provider. These may include nasal sprays.  · If you were prescribed an antibiotic medicine, take it as told by your health care provider. Do not stop taking the antibiotic even if you start to feel better.  Hydrate and Humidify  · Drink enough water to keep your urine clear or pale yellow. Staying hydrated will help to thin your mucus.  · Use a cool mist humidifier to keep the humidity level in your home above 50%.  · Inhale steam for 10-15 minutes, 3-4 times a day or as told by your health care provider. You can do this in the bathroom while a hot shower is running.  · Limit your exposure to cool or dry air.  Rest  · Rest as much as possible.  · Sleep with your head raised (elevated).  · Make sure to get enough sleep each night.  General instructions  · Apply a warm, moist washcloth to your face 3-4 times a day or as told by your health care provider. This will help with discomfort.  · Wash your hands often with soap and water to reduce your exposure to viruses and other germs. If soap and water are not available, use hand .  · Do not smoke. Avoid being around people who are smoking (secondhand smoke).  · Keep all follow-up visits as told by your health care provider. This is important.  Contact a health care provider if:  · You have a fever.  · Your symptoms get worse.  · Your symptoms do not improve within 10 days.  Get help right away if:  · You have a severe headache.  · You have persistent vomiting.  · You have pain or swelling around your face or  eyes.  · You have vision problems.  · You develop confusion.  · Your neck is stiff.  · You have trouble breathing.  This information is not intended to replace advice given to you by your health care provider. Make sure you discuss any questions you have with your health care provider.  Document Released: 12/18/2006 Document Revised: 08/13/2017 Document Reviewed: 10/12/2016  [employer] Interactive Patient Education © 2018 [employer]  Otitis Media, Adult  Otitis media occurs when there is inflammation and fluid in the middle ear. Your middle ear is a part of the ear that contains bones for hearing as well as air that helps send sounds to your brain.  What are the causes?  This condition is caused by a blockage in the eustachian tube. This tube drains fluid from the ear to the back of the nose (nasopharynx). A blockage in this tube can be caused by an object or by swelling (edema) in the tube. Problems that can cause a blockage include:  · A cold or other upper respiratory infection.  · Allergies.  · An irritant, such as tobacco smoke.  · Enlarged adenoids. The adenoids are areas of soft tissue located high in the back of the throat, behind the nose and the roof of the mouth.  · A mass in the nasopharynx.  · Damage to the ear caused by pressure changes (barotrauma).    What are the signs or symptoms?  Symptoms of this condition include:  · Ear pain.  · A fever.  · Decreased hearing.  · A headache.  · Tiredness (lethargy).  · Fluid leaking from the ear.  · Ringing in the ear.    How is this diagnosed?  This condition is diagnosed with a physical exam. During the exam your health care provider will use an instrument called an otoscope to look into your ear and check for redness, swelling, and fluid. He or she will also ask about your symptoms.  Your health care provider may also order tests, such as:  · A test to check the movement of the eardrum (pneumatic otoscopy). This test is done by squeezing a small amount of  air into the ear.  · A test that changes air pressure in the middle ear to check how well the eardrum moves and whether the eustachian tube is working (tympanogram).    How is this treated?  This condition usually goes away on its own within 3-5 days. But if the condition is caused by a bacteria infection and does not go away own its own, or keeps coming back, your health care provider may:  · Prescribe antibiotic medicines to treat the infection.  · Prescribe or recommend medicines to control pain.    Follow these instructions at home:  · Take over-the-counter and prescription medicines only as told by your health care provider.  · If you were prescribed an antibiotic medicine, take it as told by your health care provider. Do not stop taking the antibiotic even if you start to feel better.  · Keep all follow-up visits as told by your health care provider. This is important.  Contact a health care provider if:  · You have bleeding from your nose.  · There is a lump on your neck.  · You are not getting better in 5 days.  · You feel worse instead of better.  Get help right away if:  · You have severe pain that is not controlled with medicine.  · You have swelling, redness, or pain around your ear.  · You have stiffness in your neck.  · A part of your face is paralyzed.  · The bone behind your ear (mastoid) is tender when you touch it.  · You develop a severe headache.  Summary  · Otitis media is redness, soreness, and swelling of the middle ear.  · This condition usually goes away on its own within 3-5 days.  · If the problem does not go away in 3-5 days, your health care provider may prescribe or recommend medicines to treat your symptoms.  · If you were prescribed an antibiotic medicine, take it as told by your health care provider.  This information is not intended to replace advice given to you by your health care provider. Make sure you discuss any questions you have with your health care provider.  Document  Released: 09/22/2005 Document Revised: 12/08/2017 Document Reviewed: 12/08/2017  [employer] Interactive Patient Education © 2018 [employer]

## 2019-04-22 RX ORDER — ALPRAZOLAM 0.25 MG/1
TABLET ORAL
Qty: 30 TABLET | Refills: 0 | Status: SHIPPED | OUTPATIENT
Start: 2019-04-22 | End: 2019-08-27 | Stop reason: SDUPTHER

## 2019-07-08 RX ORDER — TRAZODONE HYDROCHLORIDE 50 MG/1
TABLET ORAL
Qty: 150 TABLET | Refills: 0 | Status: SHIPPED | OUTPATIENT
Start: 2019-07-08 | End: 2019-12-12 | Stop reason: SDUPTHER

## 2019-07-09 ENCOUNTER — OFFICE VISIT (OUTPATIENT)
Dept: RETAIL CLINIC | Facility: CLINIC | Age: 59
End: 2019-07-09

## 2019-07-09 VITALS
DIASTOLIC BLOOD PRESSURE: 90 MMHG | SYSTOLIC BLOOD PRESSURE: 141 MMHG | TEMPERATURE: 98.2 F | HEART RATE: 82 BPM | OXYGEN SATURATION: 98 %

## 2019-07-09 DIAGNOSIS — H65.01 RIGHT ACUTE SEROUS OTITIS MEDIA, RECURRENCE NOT SPECIFIED: Primary | ICD-10-CM

## 2019-07-09 DIAGNOSIS — R05.9 COUGH: ICD-10-CM

## 2019-07-09 DIAGNOSIS — H65.112 ACUTE MUCOID OTITIS MEDIA OF LEFT EAR: ICD-10-CM

## 2019-07-09 DIAGNOSIS — R59.1 LYMPHADENOPATHY: ICD-10-CM

## 2019-07-09 PROCEDURE — 99213 OFFICE O/P EST LOW 20 MIN: CPT | Performed by: NURSE PRACTITIONER

## 2019-07-09 RX ORDER — BENZONATATE 100 MG/1
CAPSULE ORAL
Qty: 30 CAPSULE | Refills: 0 | Status: SHIPPED | OUTPATIENT
Start: 2019-07-09 | End: 2019-09-23

## 2019-07-09 RX ORDER — PREDNISONE 20 MG/1
20 TABLET ORAL 2 TIMES DAILY
Qty: 6 TABLET | Refills: 0 | Status: SHIPPED | OUTPATIENT
Start: 2019-07-09 | End: 2019-07-12

## 2019-07-09 RX ORDER — AMOXICILLIN AND CLAVULANATE POTASSIUM 875; 125 MG/1; MG/1
1 TABLET, FILM COATED ORAL 2 TIMES DAILY
Qty: 20 TABLET | Refills: 0 | Status: SHIPPED | OUTPATIENT
Start: 2019-07-09 | End: 2019-07-09

## 2019-07-09 RX ORDER — CEFDINIR 300 MG/1
300 CAPSULE ORAL 2 TIMES DAILY
Qty: 20 CAPSULE | Refills: 0 | Status: SHIPPED | OUTPATIENT
Start: 2019-07-09 | End: 2019-07-19

## 2019-07-09 NOTE — PROGRESS NOTES
Estuardo Stein is a 58 y.o.. female.     Earache    There is pain in both (left worse than right) ears. This is a new problem. Episode onset: 4 days. The problem has been gradually worsening. There has been no fever. Associated symptoms include coughing, headaches and rhinorrhea. Pertinent negatives include no abdominal pain, diarrhea, sore throat or vomiting. Treatments tried: dayquil. The treatment provided no relief.       The following portions of the patient's history were reviewed and updated as appropriate: allergies, current medications, past medical history, past social history and past surgical history.    Review of Systems   Constitutional: Negative for fever.   HENT: Positive for congestion, ear pain, postnasal drip and rhinorrhea. Negative for sore throat.    Respiratory: Positive for cough.    Gastrointestinal: Negative for abdominal pain, diarrhea, nausea and vomiting.   Neurological: Positive for headaches.       Objective     Vitals:    07/09/19 1706   BP: 141/90   Pulse: 82   Temp: 98.2 °F (36.8 °C)   TempSrc: Oral   SpO2: 98%       Physical Exam   Constitutional: She is oriented to person, place, and time. She appears well-developed and well-nourished.   HENT:   Head: Normocephalic and atraumatic.   Right Ear: Tympanic membrane is not erythematous. A middle ear effusion (clear) is present.   Left Ear: Tympanic membrane is erythematous. A middle ear effusion (clear, hazy) is present.   Nose: Nose normal. Right sinus exhibits no maxillary sinus tenderness and no frontal sinus tenderness. Left sinus exhibits no maxillary sinus tenderness and no frontal sinus tenderness.   Mouth/Throat: Oropharynx is clear and moist. Oropharyngeal exudate: pnd.   Eyes: Conjunctivae are normal. Pupils are equal, round, and reactive to light.   Cardiovascular: Normal rate and regular rhythm.   No murmur heard.  Pulmonary/Chest: Effort normal. She has no wheezes. She has no rhonchi. She has no rales.    Abdominal: Soft. Normal appearance and bowel sounds are normal. There is no hepatosplenomegaly. There is no tenderness. There is no rigidity, no rebound and no guarding.   Musculoskeletal: Normal range of motion.   Lymphadenopathy:     She has cervical adenopathy (left side only).   Neurological: She is alert and oriented to person, place, and time.   Skin: Skin is warm and dry.   Vitals reviewed.      Assessment/Plan   Sammi was seen today for earache.    Diagnoses and all orders for this visit:    Right acute serous otitis media, recurrence not specified    Acute mucoid otitis media of left ear  -     Discontinue: amoxicillin-clavulanate (AUGMENTIN) 875-125 MG per tablet; Take 1 tablet by mouth 2 (Two) Times a Day for 10 days.  -     cefdinir (OMNICEF) 300 MG capsule; Take 1 capsule by mouth 2 (Two) Times a Day for 10 days.    Lymphadenopathy  -     predniSONE (DELTASONE) 20 MG tablet; Take 1 tablet by mouth 2 (Two) Times a Day for 3 days.    Cough  -     benzonatate (TESSALON PERLES) 100 MG capsule; 1 capsule 3 times a day as needed for 10 days        Patient Instructions   Otitis Media, Adult  Otitis media occurs when there is inflammation and fluid in the middle ear. Your middle ear is a part of the ear that contains bones for hearing as well as air that helps send sounds to your brain.  What are the causes?  This condition is caused by a blockage in the eustachian tube. This tube drains fluid from the ear to the back of the nose (nasopharynx). A blockage in this tube can be caused by an object or by swelling (edema) in the tube. Problems that can cause a blockage include:  · A cold or other upper respiratory infection.  · Allergies.  · An irritant, such as tobacco smoke.  · Enlarged adenoids. The adenoids are areas of soft tissue located high in the back of the throat, behind the nose and the roof of the mouth.  · A mass in the nasopharynx.  · Damage to the ear caused by pressure changes  (barotrauma).    What are the signs or symptoms?  Symptoms of this condition include:  · Ear pain.  · A fever.  · Decreased hearing.  · A headache.  · Tiredness (lethargy).  · Fluid leaking from the ear.  · Ringing in the ear.    How is this diagnosed?  This condition is diagnosed with a physical exam. During the exam your health care provider will use an instrument called an otoscope to look into your ear and check for redness, swelling, and fluid. He or she will also ask about your symptoms.  Your health care provider may also order tests, such as:  · A test to check the movement of the eardrum (pneumatic otoscopy). This test is done by squeezing a small amount of air into the ear.  · A test that changes air pressure in the middle ear to check how well the eardrum moves and whether the eustachian tube is working (tympanogram).    How is this treated?  This condition usually goes away on its own within 3-5 days. But if the condition is caused by a bacteria infection and does not go away own its own, or keeps coming back, your health care provider may:  · Prescribe antibiotic medicines to treat the infection.  · Prescribe or recommend medicines to control pain.    Follow these instructions at home:  · Take over-the-counter and prescription medicines only as told by your health care provider.  · If you were prescribed an antibiotic medicine, take it as told by your health care provider. Do not stop taking the antibiotic even if you start to feel better.  · Keep all follow-up visits as told by your health care provider. This is important.  Contact a health care provider if:  · You have bleeding from your nose.  · There is a lump on your neck.  · You are not getting better in 5 days.  · You feel worse instead of better.  Get help right away if:  · You have severe pain that is not controlled with medicine.  · You have swelling, redness, or pain around your ear.  · You have stiffness in your neck.  · A part of your face  is paralyzed.  · The bone behind your ear (mastoid) is tender when you touch it.  · You develop a severe headache.  Summary  · Otitis media is redness, soreness, and swelling of the middle ear.  · This condition usually goes away on its own within 3-5 days.  · If the problem does not go away in 3-5 days, your health care provider may prescribe or recommend medicines to treat your symptoms.  · If you were prescribed an antibiotic medicine, take it as told by your health care provider.  This information is not intended to replace advice given to you by your health care provider. Make sure you discuss any questions you have with your health care provider.  Document Released: 09/22/2005 Document Revised: 12/08/2017 Document Reviewed: 12/08/2017  Else"[employer] Interactive Patient Education © 2019 Elsevier Inc.        Return if symptoms worsen or fail to improve with primary  care provider.

## 2019-07-09 NOTE — PATIENT INSTRUCTIONS

## 2019-08-27 RX ORDER — ALPRAZOLAM 0.25 MG/1
TABLET ORAL
Qty: 30 TABLET | Refills: 0 | Status: SHIPPED | OUTPATIENT
Start: 2019-08-27 | End: 2019-11-01 | Stop reason: SDUPTHER

## 2019-08-28 ENCOUNTER — TRANSCRIBE ORDERS (OUTPATIENT)
Dept: ADMINISTRATIVE | Facility: HOSPITAL | Age: 59
End: 2019-08-28

## 2019-08-28 DIAGNOSIS — Z12.31 VISIT FOR SCREENING MAMMOGRAM: Primary | ICD-10-CM

## 2019-09-09 DIAGNOSIS — Z00.00 HEALTH CARE MAINTENANCE: Primary | ICD-10-CM

## 2019-09-09 RX ORDER — SIMVASTATIN 20 MG
TABLET ORAL
Qty: 180 TABLET | Refills: 0 | Status: SHIPPED | OUTPATIENT
Start: 2019-09-09 | End: 2020-02-04

## 2019-09-10 ENCOUNTER — APPOINTMENT (OUTPATIENT)
Dept: LAB | Facility: HOSPITAL | Age: 59
End: 2019-09-10

## 2019-09-10 LAB
ALBUMIN SERPL-MCNC: 4.7 G/DL (ref 3.5–5.2)
ALBUMIN/GLOB SERPL: 1.5 G/DL
ALP SERPL-CCNC: 60 U/L (ref 39–117)
ALT SERPL W P-5'-P-CCNC: 37 U/L (ref 1–33)
ANION GAP SERPL CALCULATED.3IONS-SCNC: 14.7 MMOL/L (ref 5–15)
AST SERPL-CCNC: 44 U/L (ref 1–32)
BACTERIA UR QL AUTO: ABNORMAL /HPF
BASOPHILS # BLD AUTO: 0.04 10*3/MM3 (ref 0–0.2)
BASOPHILS NFR BLD AUTO: 0.7 % (ref 0–1.5)
BILIRUB SERPL-MCNC: 0.6 MG/DL (ref 0.2–1.2)
BILIRUB UR QL STRIP: NEGATIVE
BUN BLD-MCNC: 15 MG/DL (ref 8–23)
BUN/CREAT SERPL: 26.3 (ref 7–25)
CALCIUM SPEC-SCNC: 9.8 MG/DL (ref 8.6–10.5)
CHLORIDE SERPL-SCNC: 97 MMOL/L (ref 98–107)
CHOLEST SERPL-MCNC: 226 MG/DL (ref 0–200)
CLARITY UR: CLEAR
CO2 SERPL-SCNC: 27.3 MMOL/L (ref 22–29)
COLOR UR: ABNORMAL
CREAT BLD-MCNC: 0.57 MG/DL (ref 0.57–1)
DEPRECATED RDW RBC AUTO: 46.1 FL (ref 37–54)
EOSINOPHIL # BLD AUTO: 0.05 10*3/MM3 (ref 0–0.4)
EOSINOPHIL NFR BLD AUTO: 0.9 % (ref 0.3–6.2)
ERYTHROCYTE [DISTWIDTH] IN BLOOD BY AUTOMATED COUNT: 12.4 % (ref 12.3–15.4)
GFR SERPL CREATININE-BSD FRML MDRD: 108 ML/MIN/1.73
GLOBULIN UR ELPH-MCNC: 3.1 GM/DL
GLUCOSE BLD-MCNC: 100 MG/DL (ref 65–99)
GLUCOSE UR STRIP-MCNC: NEGATIVE MG/DL
HCT VFR BLD AUTO: 40.1 % (ref 34–46.6)
HDLC SERPL-MCNC: 94 MG/DL (ref 40–60)
HGB BLD-MCNC: 14 G/DL (ref 12–15.9)
HGB UR QL STRIP.AUTO: NEGATIVE
HYALINE CASTS UR QL AUTO: ABNORMAL /LPF
IMM GRANULOCYTES # BLD AUTO: 0.01 10*3/MM3 (ref 0–0.05)
IMM GRANULOCYTES NFR BLD AUTO: 0.2 % (ref 0–0.5)
KETONES UR QL STRIP: NEGATIVE
LDLC SERPL CALC-MCNC: 88 MG/DL (ref 0–100)
LDLC/HDLC SERPL: 0.93 {RATIO}
LEUKOCYTE ESTERASE UR QL STRIP.AUTO: ABNORMAL
LYMPHOCYTES # BLD AUTO: 2.54 10*3/MM3 (ref 0.7–3.1)
LYMPHOCYTES NFR BLD AUTO: 47.5 % (ref 19.6–45.3)
MCH RBC QN AUTO: 36 PG (ref 26.6–33)
MCHC RBC AUTO-ENTMCNC: 34.9 G/DL (ref 31.5–35.7)
MCV RBC AUTO: 103.1 FL (ref 79–97)
MONOCYTES # BLD AUTO: 0.46 10*3/MM3 (ref 0.1–0.9)
MONOCYTES NFR BLD AUTO: 8.6 % (ref 5–12)
MUCOUS THREADS URNS QL MICRO: ABNORMAL /HPF
NEUTROPHILS # BLD AUTO: 2.25 10*3/MM3 (ref 1.7–7)
NEUTROPHILS NFR BLD AUTO: 42.1 % (ref 42.7–76)
NITRITE UR QL STRIP: NEGATIVE
NRBC BLD AUTO-RTO: 0.2 /100 WBC (ref 0–0.2)
PH UR STRIP.AUTO: 6 [PH] (ref 5–8)
PLATELET # BLD AUTO: 183 10*3/MM3 (ref 140–450)
PMV BLD AUTO: 10.2 FL (ref 6–12)
POTASSIUM BLD-SCNC: 3.7 MMOL/L (ref 3.5–5.2)
PROT SERPL-MCNC: 7.8 G/DL (ref 6–8.5)
PROT UR QL STRIP: NEGATIVE
RBC # BLD AUTO: 3.89 10*6/MM3 (ref 3.77–5.28)
RBC # UR: ABNORMAL /HPF
REF LAB TEST METHOD: ABNORMAL
SODIUM BLD-SCNC: 139 MMOL/L (ref 136–145)
SP GR UR STRIP: 1.02 (ref 1–1.03)
SQUAMOUS #/AREA URNS HPF: ABNORMAL /HPF
TRIGL SERPL-MCNC: 222 MG/DL (ref 0–150)
TSH SERPL DL<=0.05 MIU/L-ACNC: 2.84 UIU/ML (ref 0.27–4.2)
UROBILINOGEN UR QL STRIP: ABNORMAL
VLDLC SERPL-MCNC: 44.4 MG/DL (ref 5–40)
WBC NRBC COR # BLD: 5.35 10*3/MM3 (ref 3.4–10.8)
WBC UR QL AUTO: ABNORMAL /HPF

## 2019-09-10 PROCEDURE — 36415 COLL VENOUS BLD VENIPUNCTURE: CPT | Performed by: INTERNAL MEDICINE

## 2019-09-10 PROCEDURE — 81001 URINALYSIS AUTO W/SCOPE: CPT | Performed by: INTERNAL MEDICINE

## 2019-09-10 PROCEDURE — 84443 ASSAY THYROID STIM HORMONE: CPT | Performed by: INTERNAL MEDICINE

## 2019-09-10 PROCEDURE — 80061 LIPID PANEL: CPT | Performed by: INTERNAL MEDICINE

## 2019-09-10 PROCEDURE — 85025 COMPLETE CBC W/AUTO DIFF WBC: CPT | Performed by: INTERNAL MEDICINE

## 2019-09-10 PROCEDURE — 80053 COMPREHEN METABOLIC PANEL: CPT | Performed by: INTERNAL MEDICINE

## 2019-09-23 ENCOUNTER — OFFICE VISIT (OUTPATIENT)
Dept: INTERNAL MEDICINE | Facility: CLINIC | Age: 59
End: 2019-09-23

## 2019-09-23 VITALS
WEIGHT: 135 LBS | DIASTOLIC BLOOD PRESSURE: 84 MMHG | HEART RATE: 83 BPM | SYSTOLIC BLOOD PRESSURE: 112 MMHG | OXYGEN SATURATION: 97 % | BODY MASS INDEX: 23.05 KG/M2 | HEIGHT: 64 IN

## 2019-09-23 DIAGNOSIS — Z00.00 WELL ADULT EXAM: Primary | ICD-10-CM

## 2019-09-23 PROCEDURE — 99396 PREV VISIT EST AGE 40-64: CPT | Performed by: INTERNAL MEDICINE

## 2019-09-23 NOTE — PATIENT INSTRUCTIONS
Kegel Exercises  Kegel exercises help strengthen the muscles that support the rectum, vagina, small intestine, bladder, and uterus. Doing Kegel exercises can help:  · Improve bladder and bowel control.  · Improve sexual response.  · Reduce problems and discomfort during pregnancy.  Kegel exercises involve squeezing your pelvic floor muscles, which are the same muscles you squeeze when you try to stop the flow of urine. The exercises can be done while sitting, standing, or lying down, but it is best to vary your position.  Exercises  1. Squeeze your pelvic floor muscles tight. You should feel a tight lift in your rectal area. If you are a female, you should also feel a tightness in your vaginal area. Keep your stomach, buttocks, and legs relaxed.  2. Hold the muscles tight for up to 10 seconds.  3. Relax your muscles.  Repeat this exercise 50 times a day or as many times as told by your health care provider. Continue to do this exercise for at least 4-6 weeks or for as long as told by your health care provider.  This information is not intended to replace advice given to you by your health care provider. Make sure you discuss any questions you have with your health care provider.  Document Released: 12/04/2013 Document Revised: 04/30/2018 Document Reviewed: 11/06/2016  [employer] Interactive Patient Education © 2019 Elsevier Inc.

## 2019-09-23 NOTE — PROGRESS NOTES
Subjective     Sammi Stein is a 59 y.o. female who presents for a complete physical exam.      History of Present Illness     HLD.  Fair control with current regimen.    Mild increase trig/increase in LFTs/increase MCV. She states she has had some celebrations recently and drinking more alcohol than usual.     Review of Systems   Constitutional: Negative.    HENT: Negative.    Eyes: Negative.    Respiratory: Negative.    Cardiovascular: Negative.    Gastrointestinal: Negative.    Endocrine: Negative.    Genitourinary: Negative.    Musculoskeletal: Negative.    Skin: Negative.    Allergic/Immunologic: Negative.    Neurological: Negative.    Hematological: Negative.    Psychiatric/Behavioral: Negative.        The following portions of the patient's history were reviewed and updated as appropriate: allergies, current medications, past family history, past medical history, past social history, past surgical history and problem list.  Health maintenance tab was reviewed and updated with the patient.       Patient Active Problem List    Diagnosis Date Noted   • Panic attack 01/22/2016     Note Last Updated: 8/16/2016     Description: Rare use of Xanax.  Contract on 8/16/2016     • Irritable bowel 01/22/2016   • HLD (hyperlipidemia) 01/22/2016   • Gastroesophageal reflux disease with esophagitis 01/22/2016       Past Medical History:   Diagnosis Date   • Abnormal EKG     Anterior T wave changes at baseline   • Abnormal liver function test    • Acid reflux    • Blood tests for routine general physical examination    • Elevated cholesterol    • Gastritis    • Gastritis    • GERD (gastroesophageal reflux disease)    • Herpes labialis    • Herpes labialis    • Hyperlipidemia    • IBS (irritable bowel syndrome)    • Panic attack     Rare use of Xanax.  Contract on 8/6/14   • SVT (supraventricular tachycardia) (CMS/[facility])     Holter on 8/23/16 with several brief runs of SVT of up to 7 beats       Past Surgical History:    Procedure Laterality Date   • BLADDER SURGERY     • BREAST CYST ASPIRATION Right     about 20 years ago   • CHOLECYSTECTOMY     • SALPINGO OOPHORECTOMY     • TOTAL ABDOMINAL HYSTERECTOMY  1992    done for fibroids   • TOTAL ABDOMINAL HYSTERECTOMY WITH SALPINGO OOPHORECTOMY  1992       Family History   Problem Relation Age of Onset   • Diabetes Father    • Heart attack Father         Father with fatal MI at 76   • Diabetes Sister         type 2, two sisters   • Rectal cancer Brother    • Rheumatic fever Brother    • Diabetes Sister    • Stroke Sister    • Ovarian cancer Maternal Aunt        Social History     Socioeconomic History   • Marital status:      Spouse name: Not on file   • Number of children: Not on file   • Years of education: Not on file   • Highest education level: Not on file   Tobacco Use   • Smoking status: Never Smoker   • Smokeless tobacco: Never Used   Substance and Sexual Activity   • Alcohol use: Yes     Comment: Socially    • Drug use: No       Current Outpatient Medications on File Prior to Visit   Medication Sig Dispense Refill   • ALPRAZolam (XANAX) 0.25 MG tablet TAKE 1 TABLET BY MOUTH EVERY 6 TO 8 HOURS AS NEEDED 30 tablet 0   • azelastine (ASTEPRO) 0.15 % solution nasal spray 1 spray into the nostril(s) as directed by provider 2 (Two) Times a Day As Needed for Rhinitis. 1 each 0   • hyoscyamine (ANASPAZ,LEVSIN) 0.125 MG tablet Take by mouth. Take 1 tablet 3-4 times daily as needed     • Multiple Vitamins-Minerals (MULTIVITAMIN ADULT PO) Take  by mouth.     • omeprazole (PriLOSEC) 40 MG capsule Take 1 capsule by mouth Daily.     • simvastatin (ZOCOR) 20 MG tablet TAKE 1 TABLET BY MOUTH EVERY  tablet 0   • traZODone (DESYREL) 50 MG tablet TAKE 1/2 TO 1 TABLET BY MOUTH EVERY NIGHT AT BEDTIME 150 tablet 0   • valACYclovir (VALTREX) 1000 MG tablet Take 1 tablet by mouth 2 (Two) Times a Day. 90 tablet 2   • [DISCONTINUED] benzonatate (TESSALON PERLES) 100 MG capsule 1 capsule 3  "times a day as needed for 10 days 30 capsule 0     No current facility-administered medications on file prior to visit.        Allergies   Allergen Reactions   • Codeine Nausea Only       Immunization History   Administered Date(s) Administered   • HPV Quadrivalent 10/02/2006, 12/29/2006   • Hepatitis A 08/31/2018   • Tdap 08/16/2016       Objective     /84   Pulse 83   Ht 162.6 cm (64.02\")   Wt 61.2 kg (135 lb)   SpO2 97%   BMI 23.16 kg/m²     Physical Exam   Constitutional: She is oriented to person, place, and time. She appears well-developed and well-nourished.   HENT:   Head: Normocephalic and atraumatic.   Right Ear: Hearing, tympanic membrane and external ear normal.   Left Ear: Hearing, tympanic membrane and external ear normal.   Nose: Nose normal.   Mouth/Throat: Oropharynx is clear and moist.   Neck: Neck supple. No thyromegaly present.   Cardiovascular: Normal rate, regular rhythm and normal heart sounds.   No murmur heard.  Pulmonary/Chest: Effort normal and breath sounds normal. Right breast exhibits no mass. Left breast exhibits no mass.   Abdominal: Soft. She exhibits no distension. There is no hepatosplenomegaly. There is no tenderness.   Genitourinary: No breast tenderness.   Lymphadenopathy:     She has no cervical adenopathy.   Neurological: She is alert and oriented to person, place, and time.   Skin: Skin is warm and dry.   Psychiatric: She has a normal mood and affect. Her speech is normal and behavior is normal. Judgment and thought content normal. Cognition and memory are normal.       Assessment/Plan   Sammi was seen today for annual exam.    Diagnoses and all orders for this visit:    Well adult exam        Discussion    Patient presents today for a CPE.      Patient follows a healthy diet.   Patient follows an adequate exercise regimen. Mammogram is up to date.   Colon cancer screening is up to date.   Pap smear no longer performed secondary to hysterectomy.      Health " Maintenance   Topic Date Due   • ZOSTER VACCINE (1 of 2) 07/15/2008   • ANNUAL PHYSICAL  09/01/2019   • COLONOSCOPY  03/26/2020   • LIPID PANEL  09/10/2020   • MAMMOGRAM  09/12/2020   • TDAP/TD VACCINES (2 - Td) 08/16/2026   • HEPATITIS C SCREENING  Addressed   • INFLUENZA VACCINE  Addressed   • PAP SMEAR  Discontinued            Future Appointments   Date Time Provider Department Center   9/24/2019 10:00 AM [facility]   9/23/2020  9:00 AM [facility] None   9/28/2020 10:00 AM Christine Morris MD [facility] None

## 2019-09-24 ENCOUNTER — HOSPITAL ENCOUNTER (OUTPATIENT)
Dept: MAMMOGRAPHY | Facility: HOSPITAL | Age: 59
Discharge: HOME | End: 2019-09-24
Admitting: INTERNAL MEDICINE

## 2019-09-24 DIAGNOSIS — Z12.31 VISIT FOR SCREENING MAMMOGRAM: ICD-10-CM

## 2019-09-24 PROCEDURE — 77063 BREAST TOMOSYNTHESIS BI: CPT

## 2019-09-24 PROCEDURE — 77067 SCR MAMMO BI INCL CAD: CPT

## 2019-11-01 RX ORDER — ALPRAZOLAM 0.25 MG/1
TABLET ORAL
Qty: 30 TABLET | Refills: 0 | Status: SHIPPED | OUTPATIENT
Start: 2019-11-01 | End: 2020-02-03

## 2019-12-12 RX ORDER — TRAZODONE HYDROCHLORIDE 50 MG/1
TABLET ORAL
Qty: 150 TABLET | Refills: 0 | Status: SHIPPED | OUTPATIENT
Start: 2019-12-12 | End: 2020-05-08

## 2020-01-21 RX ORDER — VALACYCLOVIR HYDROCHLORIDE 1 G/1
TABLET, FILM COATED ORAL
Qty: 240 TABLET | Refills: 1 | Status: SHIPPED | OUTPATIENT
Start: 2020-01-21 | End: 2021-10-11

## 2020-02-01 DIAGNOSIS — F41.0 PANIC ATTACK: Primary | ICD-10-CM

## 2020-02-03 RX ORDER — ALPRAZOLAM 0.25 MG/1
TABLET ORAL
Qty: 30 TABLET | Refills: 0 | Status: SHIPPED | OUTPATIENT
Start: 2020-02-03 | End: 2020-03-20

## 2020-02-04 RX ORDER — SIMVASTATIN 20 MG
TABLET ORAL
Qty: 180 TABLET | Refills: 0 | Status: SHIPPED | OUTPATIENT
Start: 2020-02-04 | End: 2020-08-14

## 2020-03-20 DIAGNOSIS — F41.0 PANIC ATTACK: ICD-10-CM

## 2020-03-20 RX ORDER — ALPRAZOLAM 0.25 MG/1
TABLET ORAL
Qty: 30 TABLET | Refills: 0 | Status: SHIPPED | OUTPATIENT
Start: 2020-03-20 | End: 2020-08-03

## 2020-05-08 RX ORDER — TRAZODONE HYDROCHLORIDE 50 MG/1
TABLET ORAL
Qty: 150 TABLET | Refills: 0 | Status: SHIPPED | OUTPATIENT
Start: 2020-05-08 | End: 2020-10-21 | Stop reason: SDUPTHER

## 2020-08-03 DIAGNOSIS — F41.0 PANIC ATTACK: ICD-10-CM

## 2020-08-03 RX ORDER — ALPRAZOLAM 0.25 MG/1
TABLET ORAL
Qty: 30 TABLET | Refills: 0 | Status: SHIPPED | OUTPATIENT
Start: 2020-08-03 | End: 2020-10-21 | Stop reason: SDUPTHER

## 2020-08-14 RX ORDER — SIMVASTATIN 20 MG
TABLET ORAL
Qty: 90 TABLET | Refills: 0 | Status: SHIPPED | OUTPATIENT
Start: 2020-08-14 | End: 2020-11-16

## 2020-09-09 ENCOUNTER — TRANSCRIBE ORDERS (OUTPATIENT)
Dept: INTERNAL MEDICINE | Facility: CLINIC | Age: 60
End: 2020-09-09

## 2020-09-09 ENCOUNTER — TELEPHONE (OUTPATIENT)
Dept: INTERNAL MEDICINE | Facility: CLINIC | Age: 60
End: 2020-09-09

## 2020-09-09 DIAGNOSIS — N64.4 BREAST PAIN: Primary | ICD-10-CM

## 2020-09-09 DIAGNOSIS — Z12.31 VISIT FOR SCREENING MAMMOGRAM: Primary | ICD-10-CM

## 2020-09-09 NOTE — TELEPHONE ENCOUNTER
Patient called and stated that she called [facility] to schedule her mammo screening. She told them that she is having soreness in her breast. They advised her to call office and have Dr. Morris put in an order for a diagnostic mammogram.   Patient would like to know when order is put in so she can call back and schedule

## 2020-09-23 ENCOUNTER — LAB (OUTPATIENT)
Dept: LAB | Facility: HOSPITAL | Age: 60
End: 2020-09-23

## 2020-09-23 DIAGNOSIS — Z00.00 HEALTH CARE MAINTENANCE: Primary | ICD-10-CM

## 2020-09-23 LAB
ALBUMIN SERPL-MCNC: 4.8 G/DL (ref 3.5–5.2)
ALBUMIN/GLOB SERPL: 1.8 G/DL
ALP SERPL-CCNC: 66 U/L (ref 39–117)
ALT SERPL W P-5'-P-CCNC: 40 U/L (ref 1–33)
ANION GAP SERPL CALCULATED.3IONS-SCNC: 16 MMOL/L (ref 5–15)
AST SERPL-CCNC: 43 U/L (ref 1–32)
BACTERIA UR QL AUTO: ABNORMAL /HPF
BASOPHILS # BLD AUTO: 0.07 10*3/MM3 (ref 0–0.2)
BASOPHILS NFR BLD AUTO: 0.9 % (ref 0–1.5)
BILIRUB SERPL-MCNC: 1.1 MG/DL (ref 0–1.2)
BILIRUB UR QL STRIP: NEGATIVE
BUN SERPL-MCNC: 15 MG/DL (ref 8–23)
BUN/CREAT SERPL: 30.6 (ref 7–25)
CALCIUM SPEC-SCNC: 9.7 MG/DL (ref 8.6–10.5)
CHLORIDE SERPL-SCNC: 101 MMOL/L (ref 98–107)
CHOLEST SERPL-MCNC: 209 MG/DL (ref 0–200)
CLARITY UR: CLEAR
CO2 SERPL-SCNC: 23 MMOL/L (ref 22–29)
COLOR UR: ABNORMAL
CREAT SERPL-MCNC: 0.49 MG/DL (ref 0.57–1)
DEPRECATED RDW RBC AUTO: 46.4 FL (ref 37–54)
EOSINOPHIL # BLD AUTO: 0.05 10*3/MM3 (ref 0–0.4)
EOSINOPHIL NFR BLD AUTO: 0.6 % (ref 0.3–6.2)
ERYTHROCYTE [DISTWIDTH] IN BLOOD BY AUTOMATED COUNT: 12.6 % (ref 12.3–15.4)
GFR SERPL CREATININE-BSD FRML MDRD: 128 ML/MIN/1.73
GLOBULIN UR ELPH-MCNC: 2.6 GM/DL
GLUCOSE SERPL-MCNC: 110 MG/DL (ref 65–99)
GLUCOSE UR STRIP-MCNC: NEGATIVE MG/DL
HCT VFR BLD AUTO: 39.1 % (ref 34–46.6)
HDLC SERPL-MCNC: 92 MG/DL (ref 40–60)
HGB BLD-MCNC: 13.7 G/DL (ref 12–15.9)
HGB UR QL STRIP.AUTO: NEGATIVE
HYALINE CASTS UR QL AUTO: ABNORMAL /LPF
IMM GRANULOCYTES # BLD AUTO: 0.04 10*3/MM3 (ref 0–0.05)
IMM GRANULOCYTES NFR BLD AUTO: 0.5 % (ref 0–0.5)
KETONES UR QL STRIP: NEGATIVE
LDLC SERPL CALC-MCNC: 78 MG/DL (ref 0–100)
LDLC/HDLC SERPL: 0.85 {RATIO}
LEUKOCYTE ESTERASE UR QL STRIP.AUTO: ABNORMAL
LYMPHOCYTES # BLD AUTO: 3.21 10*3/MM3 (ref 0.7–3.1)
LYMPHOCYTES NFR BLD AUTO: 41.7 % (ref 19.6–45.3)
MCH RBC QN AUTO: 35.6 PG (ref 26.6–33)
MCHC RBC AUTO-ENTMCNC: 35 G/DL (ref 31.5–35.7)
MCV RBC AUTO: 101.6 FL (ref 79–97)
MONOCYTES # BLD AUTO: 0.56 10*3/MM3 (ref 0.1–0.9)
MONOCYTES NFR BLD AUTO: 7.3 % (ref 5–12)
NEUTROPHILS NFR BLD AUTO: 3.77 10*3/MM3 (ref 1.7–7)
NEUTROPHILS NFR BLD AUTO: 49 % (ref 42.7–76)
NITRITE UR QL STRIP: NEGATIVE
NRBC BLD AUTO-RTO: 0.1 /100 WBC (ref 0–0.2)
PH UR STRIP.AUTO: 5.5 [PH] (ref 5–8)
PLATELET # BLD AUTO: 194 10*3/MM3 (ref 140–450)
PMV BLD AUTO: 10.2 FL (ref 6–12)
POTASSIUM SERPL-SCNC: 3.7 MMOL/L (ref 3.5–5.2)
PROT SERPL-MCNC: 7.4 G/DL (ref 6–8.5)
PROT UR QL STRIP: NEGATIVE
RBC # BLD AUTO: 3.85 10*6/MM3 (ref 3.77–5.28)
RBC # UR: ABNORMAL /HPF
REF LAB TEST METHOD: ABNORMAL
SODIUM SERPL-SCNC: 140 MMOL/L (ref 136–145)
SP GR UR STRIP: 1.02 (ref 1–1.03)
SQUAMOUS #/AREA URNS HPF: ABNORMAL /HPF
TRIGL SERPL-MCNC: 196 MG/DL (ref 0–150)
TSH SERPL DL<=0.05 MIU/L-ACNC: 3.89 UIU/ML (ref 0.27–4.2)
UROBILINOGEN UR QL STRIP: ABNORMAL
VLDLC SERPL-MCNC: 39.2 MG/DL (ref 5–40)
WBC # BLD AUTO: 7.7 10*3/MM3 (ref 3.4–10.8)
WBC UR QL AUTO: ABNORMAL /HPF

## 2020-09-23 PROCEDURE — 84443 ASSAY THYROID STIM HORMONE: CPT | Performed by: INTERNAL MEDICINE

## 2020-09-23 PROCEDURE — 81001 URINALYSIS AUTO W/SCOPE: CPT | Performed by: INTERNAL MEDICINE

## 2020-09-23 PROCEDURE — 36415 COLL VENOUS BLD VENIPUNCTURE: CPT | Performed by: INTERNAL MEDICINE

## 2020-09-23 PROCEDURE — 80061 LIPID PANEL: CPT | Performed by: INTERNAL MEDICINE

## 2020-09-23 PROCEDURE — 80053 COMPREHEN METABOLIC PANEL: CPT | Performed by: INTERNAL MEDICINE

## 2020-09-23 PROCEDURE — 85025 COMPLETE CBC W/AUTO DIFF WBC: CPT | Performed by: INTERNAL MEDICINE

## 2020-09-28 ENCOUNTER — OFFICE VISIT (OUTPATIENT)
Dept: INTERNAL MEDICINE | Facility: CLINIC | Age: 60
End: 2020-09-28

## 2020-09-28 VITALS
HEIGHT: 64 IN | DIASTOLIC BLOOD PRESSURE: 80 MMHG | WEIGHT: 136 LBS | BODY MASS INDEX: 23.22 KG/M2 | OXYGEN SATURATION: 98 % | HEART RATE: 87 BPM | SYSTOLIC BLOOD PRESSURE: 118 MMHG

## 2020-09-28 DIAGNOSIS — E78.5 HYPERLIPIDEMIA, UNSPECIFIED HYPERLIPIDEMIA TYPE: ICD-10-CM

## 2020-09-28 DIAGNOSIS — F41.0 PANIC ATTACK: ICD-10-CM

## 2020-09-28 DIAGNOSIS — Z12.11 COLON CANCER SCREENING: ICD-10-CM

## 2020-09-28 DIAGNOSIS — Z00.00 WELL ADULT EXAM: Primary | ICD-10-CM

## 2020-09-28 PROCEDURE — 99396 PREV VISIT EST AGE 40-64: CPT | Performed by: INTERNAL MEDICINE

## 2020-09-28 RX ORDER — HYOSCYAMINE SULFATE 0.125 MG
0.12 TABLET ORAL EVERY 4 HOURS PRN
Qty: 30 TABLET | Refills: 11 | Status: SHIPPED | OUTPATIENT
Start: 2020-09-28 | End: 2021-12-30

## 2020-09-28 NOTE — PROGRESS NOTES
Subjective     Sammi Stein is a 60 y.o. female who presents for a complete physical exam.      History of Present Illness     HLD.  Control is good with simvastatin.   Anxiety/panic.  Controlled with rare Xanax.     Review of Systems   Constitutional: Negative.    HENT: Negative.    Eyes: Negative.    Respiratory: Negative.    Cardiovascular: Negative.    Gastrointestinal: Negative.    Endocrine: Negative.    Genitourinary: Negative.    Musculoskeletal: Negative.    Skin: Negative.    Allergic/Immunologic: Negative.    Neurological: Negative.    Hematological: Negative.    Psychiatric/Behavioral: The patient is nervous/anxious.        The following portions of the patient's history were reviewed and updated as appropriate: allergies, current medications, past family history, past medical history, past social history, past surgical history and problem list.  Health maintenance tab was reviewed and updated with the patient.       Patient Active Problem List    Diagnosis Date Noted   • Panic attack 01/22/2016     Note Last Updated: 8/16/2016     Description: Rare use of Xanax.  Contract on 8/16/2016     • Irritable bowel 01/22/2016   • HLD (hyperlipidemia) 01/22/2016   • Gastroesophageal reflux disease with esophagitis 01/22/2016       Past Medical History:   Diagnosis Date   • Abnormal EKG     Anterior T wave changes at baseline   • Abnormal liver function test    • Acid reflux    • Blood tests for routine general physical examination    • Elevated cholesterol    • Gastritis    • Gastritis    • GERD (gastroesophageal reflux disease)    • Herpes labialis    • Herpes labialis    • Hyperlipidemia    • IBS (irritable bowel syndrome)    • Panic attack     Rare use of Xanax.  Contract on 8/6/14   • SVT (supraventricular tachycardia) (CMS/[facility])     Holter on 8/23/16 with several brief runs of SVT of up to 7 beats       Past Surgical History:   Procedure Laterality Date   • BLADDER SURGERY     • BREAST CYST ASPIRATION  "Right     about 20 years ago   • CHOLECYSTECTOMY     • SALPINGO OOPHORECTOMY     • TOTAL ABDOMINAL HYSTERECTOMY  1992    done for fibroids   • TOTAL ABDOMINAL HYSTERECTOMY WITH SALPINGO OOPHORECTOMY  1992       Family History   Problem Relation Age of Onset   • Diabetes Father    • Heart attack Father         Father with fatal MI at 76   • Diabetes Sister         type 2, two sisters   • Rectal cancer Brother    • Rheumatic fever Brother    • Diabetes Sister    • Stroke Sister    • Ovarian cancer Maternal Aunt    • Heart attack Mother        Social History     Socioeconomic History   • Marital status:      Spouse name: Not on file   • Number of children: Not on file   • Years of education: Not on file   • Highest education level: Not on file   Tobacco Use   • Smoking status: Never Smoker   • Smokeless tobacco: Never Used   Substance and Sexual Activity   • Alcohol use: Yes     Comment: Socially    • Drug use: No       Current Outpatient Medications on File Prior to Visit   Medication Sig Dispense Refill   • ALPRAZolam (XANAX) 0.25 MG tablet TAKE 1 TABLET BY MOUTH EVERY 6 TO 8 HOURS AS NEEDED 30 tablet 0   • Chlorpheniramine-Acetaminophen (CORICIDIN) 2-325 MG tablet Take  by mouth.     • Multiple Vitamins-Minerals (MULTIVITAMIN ADULT PO) Take  by mouth.     • simvastatin (ZOCOR) 20 MG tablet TAKE 1 TABLET BY MOUTH EVERY DAY 90 tablet 0   • traZODone (DESYREL) 50 MG tablet TAKE 1/2 TO 1 TABLET BY MOUTH EVERY NIGHT AT BEDTIME 150 tablet 0   • valACYclovir (VALTREX) 1000 MG tablet TAKE 1 TABLET BY MOUTH EVERY 12 HOURS 240 tablet 1     No current facility-administered medications on file prior to visit.        Allergies   Allergen Reactions   • Codeine Nausea Only       Immunization History   Administered Date(s) Administered   • HPV Quadrivalent 10/02/2006, 12/29/2006   • Hepatitis A 08/31/2018   • Tdap 08/16/2016       Objective     /80   Pulse 87   Ht 162.6 cm (64.02\")   Wt 61.7 kg (136 lb)   SpO2 98%  "  BMI 23.33 kg/m²     Physical Exam  Constitutional:       Appearance: She is well-developed.   HENT:      Head: Normocephalic and atraumatic.      Right Ear: Hearing, tympanic membrane and external ear normal.      Left Ear: Hearing, tympanic membrane and external ear normal.      Nose: Nose normal.   Neck:      Musculoskeletal: Neck supple.      Thyroid: No thyromegaly.   Cardiovascular:      Rate and Rhythm: Normal rate and regular rhythm.      Heart sounds: Normal heart sounds. No murmur.   Pulmonary:      Effort: Pulmonary effort is normal.      Breath sounds: Normal breath sounds.   Chest:      Breasts:         Right: No mass.         Left: No mass.   Abdominal:      General: There is no distension.      Palpations: Abdomen is soft.      Tenderness: There is no abdominal tenderness.   Lymphadenopathy:      Cervical: No cervical adenopathy.   Skin:     General: Skin is warm and dry.   Neurological:      Mental Status: She is alert and oriented to person, place, and time.   Psychiatric:         Speech: Speech normal.         Behavior: Behavior normal.         Thought Content: Thought content normal.         Judgment: Judgment normal.         Assessment/Plan   Sammi was seen today for annual exam.    Diagnoses and all orders for this visit:    Well adult exam    Colon cancer screening  -     Ambulatory Referral For Screening Colonoscopy    Hyperlipidemia, unspecified hyperlipidemia type    Panic attack    Other orders  -     hyoscyamine (ANASPAZ,LEVSIN) 0.125 MG tablet; Take 1 tablet by mouth Every 4 (Four) Hours As Needed for Cramping.        Discussion    Patient presents today for a CPE.      Patient follows a healthy diet.   Patient follows an inadequate exercise regimen.   Mammogram is up to date.   Patient has been referred for colon cancer screening.   Pap smear no longer performed secondary to hysterectomy.  I have recommended that the patient get the following immunizations:  Shingrix.  HLD.  The  patient will continue current regimen.      Panic attacks.  The patient will continue current regimen.    Update contract.      Health Maintenance   Topic Date Due   • ZOSTER VACCINE (1 of 2) 07/15/2008   • COLONOSCOPY  03/26/2020   • ANNUAL PHYSICAL  09/24/2020   • INFLUENZA VACCINE  09/28/2021 (Originally 8/1/2020)   • LIPID PANEL  09/23/2021   • MAMMOGRAM  09/24/2021   • TDAP/TD VACCINES (2 - Td) 08/16/2026   • HEPATITIS C SCREENING  Addressed   • Pneumococcal Vaccine 0-64  Aged Out   • PAP SMEAR  Discontinued            Future Appointments   Date Time Provider Department Center   9/29/2020 11:30 AM [facility]   3/30/2021 11:10 AM [facility] None   9/24/2021 11:00 AM [facility] None   10/1/2021 10:00 AM Christine Morris MD [facility] None

## 2020-09-29 ENCOUNTER — HOSPITAL ENCOUNTER (OUTPATIENT)
Dept: ULTRASOUND IMAGING | Facility: HOSPITAL | Age: 60
Discharge: HOME | End: 2020-09-29

## 2020-09-29 ENCOUNTER — HOSPITAL ENCOUNTER (OUTPATIENT)
Dept: MAMMOGRAPHY | Facility: HOSPITAL | Age: 60
Discharge: HOME | End: 2020-09-29

## 2020-09-29 DIAGNOSIS — N64.4 BREAST PAIN: ICD-10-CM

## 2020-09-29 PROCEDURE — 77066 DX MAMMO INCL CAD BI: CPT

## 2020-09-29 PROCEDURE — 76642 ULTRASOUND BREAST LIMITED: CPT

## 2020-10-21 DIAGNOSIS — F41.0 PANIC ATTACK: ICD-10-CM

## 2020-10-21 RX ORDER — TRAZODONE HYDROCHLORIDE 50 MG/1
25-50 TABLET ORAL NIGHTLY
Qty: 150 TABLET | Refills: 0 | Status: SHIPPED | OUTPATIENT
Start: 2020-10-21 | End: 2021-03-11

## 2020-10-21 RX ORDER — ALPRAZOLAM 0.25 MG/1
TABLET ORAL
Qty: 30 TABLET | Refills: 1 | Status: SHIPPED | OUTPATIENT
Start: 2020-10-21 | End: 2021-02-11

## 2020-11-16 RX ORDER — SIMVASTATIN 20 MG
TABLET ORAL
Qty: 90 TABLET | Refills: 3 | Status: SHIPPED | OUTPATIENT
Start: 2020-11-16 | End: 2021-10-01

## 2021-02-11 DIAGNOSIS — F41.0 PANIC ATTACK: ICD-10-CM

## 2021-02-11 RX ORDER — ALPRAZOLAM 0.25 MG/1
TABLET ORAL
Qty: 30 TABLET | Refills: 0 | Status: SHIPPED | OUTPATIENT
Start: 2021-02-11 | End: 2021-04-01

## 2021-03-11 RX ORDER — TRAZODONE HYDROCHLORIDE 50 MG/1
TABLET ORAL
Qty: 150 TABLET | Refills: 0 | Status: SHIPPED | OUTPATIENT
Start: 2021-03-11 | End: 2021-08-02

## 2021-04-01 DIAGNOSIS — F41.0 PANIC ATTACK: ICD-10-CM

## 2021-04-01 RX ORDER — ALPRAZOLAM 0.25 MG/1
TABLET ORAL
Qty: 30 TABLET | Refills: 0 | Status: SHIPPED | OUTPATIENT
Start: 2021-04-01 | End: 2021-06-25

## 2021-06-25 DIAGNOSIS — F41.0 PANIC ATTACK: ICD-10-CM

## 2021-06-25 RX ORDER — ALPRAZOLAM 0.25 MG/1
TABLET ORAL
Qty: 30 TABLET | Refills: 0 | Status: SHIPPED | OUTPATIENT
Start: 2021-06-25 | End: 2021-10-11

## 2021-08-02 RX ORDER — TRAZODONE HYDROCHLORIDE 50 MG/1
TABLET ORAL
Qty: 150 TABLET | Refills: 0 | Status: SHIPPED | OUTPATIENT
Start: 2021-08-02 | End: 2021-08-19

## 2021-08-19 ENCOUNTER — TELEPHONE (OUTPATIENT)
Dept: INTERNAL MEDICINE | Facility: CLINIC | Age: 61
End: 2021-08-19

## 2021-08-19 RX ORDER — TRAZODONE HYDROCHLORIDE 50 MG/1
TABLET ORAL
Qty: 150 TABLET | Refills: 0 | Status: SHIPPED | OUTPATIENT
Start: 2021-08-19 | End: 2021-08-23 | Stop reason: SDUPTHER

## 2021-08-19 NOTE — TELEPHONE ENCOUNTER
Caller: Sammi Stein    Relationship: Self    Best call back number: [phone]*    What was the call regarding: PATIENT JUST CALLED [facility] AND THEY ADVISED HER THEY HAVE NOT RECEIVED THE REFILL REQUEST FOR TRAZODONE. THE PATIENT STATED THAT SHE CALLED AND SPOKE TO SOMEONE IN THE OFFICE AND THEY ADVISED HER THIS REFILL REQUEST WAS SENT AN HOUR AGO TO THE PHARMACY. THE PATIENT IS REQUESTING THAT THE PHARMACY BE CONTACTED BY THE OFFICE.    Do you require a callback: YES

## 2021-08-23 RX ORDER — TRAZODONE HYDROCHLORIDE 50 MG/1
50 TABLET ORAL NIGHTLY
Qty: 150 TABLET | Refills: 0 | Status: SHIPPED | OUTPATIENT
Start: 2021-08-23 | End: 2021-08-24 | Stop reason: SDUPTHER

## 2021-08-23 NOTE — TELEPHONE ENCOUNTER
Caller: Sammi Stein    Relationship: Self    Best call back number:[phone]    Medication needed:   Requested Prescriptions     Pending Prescriptions Disp Refills   • traZODone (DESYREL) 50 MG tablet 150 tablet 0       When do you need the refill by: 8/23/21    What additional details did the patient provide when requesting the medication: PATIENT IS OUT OF TOWN ON VACATION AND NEEDS PRESCRIPTION SENT TO [facility] [place]     Does the patient have less than a 3 day supply:  [x] Yes  [] No    What is the patient's preferred pharmacy: [id number] - [place], [place] - [address] - [phone]  - [phone] FX

## 2021-08-24 RX ORDER — TRAZODONE HYDROCHLORIDE 50 MG/1
50 TABLET ORAL NIGHTLY
Qty: 150 TABLET | Refills: 0 | Status: SHIPPED | OUTPATIENT
Start: 2021-08-24 | End: 2022-05-16

## 2021-08-24 NOTE — TELEPHONE ENCOUNTER
Caller: Sammi Stein    Relationship: Self    Best call back number: [phone]    Medication needed:   Requested Prescriptions     Pending Prescriptions Disp Refills   • traZODone (DESYREL) 50 MG tablet 150 tablet 0     Sig: Take 1 tablet by mouth Every Night.       When do you need the refill by: 08/24/2021    What additional details did the patient provide when requesting the medication: THE PATIENT IS OUT OF THIS MEDICATION. THE [facility] IN [place] STATES THAT THEY STILL HAVE NOT RECEIVED THE PRESCRIPTION. THE PATIENT WOULD LIKE A CALL SENT TO THE PHARMACY TO CONFIRM THE MED REFILL RATHER THAN JUST A FAX.     Does the patient have less than a 3 day supply:  [x] Yes  [] No    What is the patient's preferred pharmacy: [id number] - [place], [place] - [address] - [phone] [facility] [phone] FX

## 2021-09-07 ENCOUNTER — TRANSCRIBE ORDERS (OUTPATIENT)
Dept: ADMINISTRATIVE | Facility: HOSPITAL | Age: 61
End: 2021-09-07

## 2021-09-07 DIAGNOSIS — Z12.31 BREAST CANCER SCREENING BY MAMMOGRAM: Primary | ICD-10-CM

## 2021-09-23 DIAGNOSIS — Z00.00 HEALTH CARE MAINTENANCE: Primary | ICD-10-CM

## 2021-09-23 DIAGNOSIS — E78.5 HYPERLIPIDEMIA, UNSPECIFIED HYPERLIPIDEMIA TYPE: ICD-10-CM

## 2021-09-24 ENCOUNTER — LAB (OUTPATIENT)
Dept: LAB | Facility: HOSPITAL | Age: 61
End: 2021-09-24

## 2021-09-24 DIAGNOSIS — E78.5 HYPERLIPIDEMIA, UNSPECIFIED HYPERLIPIDEMIA TYPE: Primary | ICD-10-CM

## 2021-09-24 LAB
ALBUMIN SERPL-MCNC: 4.7 G/DL (ref 3.5–5.2)
ALBUMIN/GLOB SERPL: 1.8 G/DL
ALP SERPL-CCNC: 76 U/L (ref 39–117)
ALT SERPL W P-5'-P-CCNC: 34 U/L (ref 1–33)
ANION GAP SERPL CALCULATED.3IONS-SCNC: 13.2 MMOL/L (ref 5–15)
AST SERPL-CCNC: 41 U/L (ref 1–32)
BASOPHILS # BLD AUTO: 0.06 10*3/MM3 (ref 0–0.2)
BASOPHILS NFR BLD AUTO: 0.9 % (ref 0–1.5)
BILIRUB SERPL-MCNC: 0.7 MG/DL (ref 0–1.2)
BILIRUB UR QL STRIP: NEGATIVE
BUN SERPL-MCNC: 16 MG/DL (ref 8–23)
BUN/CREAT SERPL: 27.6 (ref 7–25)
CALCIUM SPEC-SCNC: 9.9 MG/DL (ref 8.6–10.5)
CHLORIDE SERPL-SCNC: 100 MMOL/L (ref 98–107)
CHOLEST SERPL-MCNC: 204 MG/DL (ref 0–200)
CLARITY UR: CLEAR
CO2 SERPL-SCNC: 25.8 MMOL/L (ref 22–29)
COLOR UR: ABNORMAL
CREAT SERPL-MCNC: 0.58 MG/DL (ref 0.57–1)
DEPRECATED RDW RBC AUTO: 46.1 FL (ref 37–54)
EOSINOPHIL # BLD AUTO: 0.07 10*3/MM3 (ref 0–0.4)
EOSINOPHIL NFR BLD AUTO: 1 % (ref 0.3–6.2)
ERYTHROCYTE [DISTWIDTH] IN BLOOD BY AUTOMATED COUNT: 12.4 % (ref 12.3–15.4)
GFR SERPL CREATININE-BSD FRML MDRD: 105 ML/MIN/1.73
GLOBULIN UR ELPH-MCNC: 2.6 GM/DL
GLUCOSE SERPL-MCNC: 98 MG/DL (ref 65–99)
GLUCOSE UR STRIP-MCNC: NEGATIVE MG/DL
HCT VFR BLD AUTO: 38.5 % (ref 34–46.6)
HDLC SERPL-MCNC: 86 MG/DL (ref 40–60)
HGB BLD-MCNC: 13.8 G/DL (ref 12–15.9)
HGB UR QL STRIP.AUTO: NEGATIVE
IMM GRANULOCYTES # BLD AUTO: 0.03 10*3/MM3 (ref 0–0.05)
IMM GRANULOCYTES NFR BLD AUTO: 0.4 % (ref 0–0.5)
KETONES UR QL STRIP: NEGATIVE
LDLC SERPL CALC-MCNC: 85 MG/DL (ref 0–100)
LDLC/HDLC SERPL: 0.9 {RATIO}
LEUKOCYTE ESTERASE UR QL STRIP.AUTO: NEGATIVE
LYMPHOCYTES # BLD AUTO: 2.86 10*3/MM3 (ref 0.7–3.1)
LYMPHOCYTES NFR BLD AUTO: 41.1 % (ref 19.6–45.3)
MCH RBC QN AUTO: 36.8 PG (ref 26.6–33)
MCHC RBC AUTO-ENTMCNC: 35.8 G/DL (ref 31.5–35.7)
MCV RBC AUTO: 102.7 FL (ref 79–97)
MONOCYTES # BLD AUTO: 0.59 10*3/MM3 (ref 0.1–0.9)
MONOCYTES NFR BLD AUTO: 8.5 % (ref 5–12)
NEUTROPHILS NFR BLD AUTO: 3.35 10*3/MM3 (ref 1.7–7)
NEUTROPHILS NFR BLD AUTO: 48.1 % (ref 42.7–76)
NITRITE UR QL STRIP: NEGATIVE
NRBC BLD AUTO-RTO: 0 /100 WBC (ref 0–0.2)
PH UR STRIP.AUTO: 6 [PH] (ref 5–8)
PLATELET # BLD AUTO: 200 10*3/MM3 (ref 140–450)
PMV BLD AUTO: 10.4 FL (ref 6–12)
POTASSIUM SERPL-SCNC: 3.8 MMOL/L (ref 3.5–5.2)
PROT SERPL-MCNC: 7.3 G/DL (ref 6–8.5)
PROT UR QL STRIP: NEGATIVE
RBC # BLD AUTO: 3.75 10*6/MM3 (ref 3.77–5.28)
SODIUM SERPL-SCNC: 139 MMOL/L (ref 136–145)
SP GR UR STRIP: 1.02 (ref 1–1.03)
TRIGL SERPL-MCNC: 201 MG/DL (ref 0–150)
TSH SERPL DL<=0.05 MIU/L-ACNC: 2.14 UIU/ML (ref 0.27–4.2)
UROBILINOGEN UR QL STRIP: ABNORMAL
VLDLC SERPL-MCNC: 33 MG/DL (ref 5–40)
WBC # BLD AUTO: 6.96 10*3/MM3 (ref 3.4–10.8)

## 2021-09-24 PROCEDURE — 84443 ASSAY THYROID STIM HORMONE: CPT | Performed by: INTERNAL MEDICINE

## 2021-09-24 PROCEDURE — 80053 COMPREHEN METABOLIC PANEL: CPT | Performed by: INTERNAL MEDICINE

## 2021-09-24 PROCEDURE — 85025 COMPLETE CBC W/AUTO DIFF WBC: CPT | Performed by: INTERNAL MEDICINE

## 2021-09-24 PROCEDURE — 81003 URINALYSIS AUTO W/O SCOPE: CPT

## 2021-09-24 PROCEDURE — 36415 COLL VENOUS BLD VENIPUNCTURE: CPT | Performed by: INTERNAL MEDICINE

## 2021-09-24 PROCEDURE — 80061 LIPID PANEL: CPT | Performed by: INTERNAL MEDICINE

## 2021-10-01 ENCOUNTER — OFFICE VISIT (OUTPATIENT)
Dept: INTERNAL MEDICINE | Facility: CLINIC | Age: 61
End: 2021-10-01

## 2021-10-01 VITALS
DIASTOLIC BLOOD PRESSURE: 88 MMHG | BODY MASS INDEX: 23.73 KG/M2 | SYSTOLIC BLOOD PRESSURE: 138 MMHG | OXYGEN SATURATION: 97 % | HEART RATE: 63 BPM | WEIGHT: 139 LBS | HEIGHT: 64 IN

## 2021-10-01 DIAGNOSIS — M25.551 BILATERAL HIP PAIN: ICD-10-CM

## 2021-10-01 DIAGNOSIS — F41.0 PANIC ATTACK: ICD-10-CM

## 2021-10-01 DIAGNOSIS — Z00.00 WELL ADULT EXAM: Primary | ICD-10-CM

## 2021-10-01 DIAGNOSIS — R07.89 CHEST WALL PAIN: ICD-10-CM

## 2021-10-01 DIAGNOSIS — M25.552 BILATERAL HIP PAIN: ICD-10-CM

## 2021-10-01 DIAGNOSIS — E78.5 HYPERLIPIDEMIA, UNSPECIFIED HYPERLIPIDEMIA TYPE: ICD-10-CM

## 2021-10-01 PROCEDURE — 99396 PREV VISIT EST AGE 40-64: CPT | Performed by: INTERNAL MEDICINE

## 2021-10-01 PROCEDURE — 71046 X-RAY EXAM CHEST 2 VIEWS: CPT | Performed by: INTERNAL MEDICINE

## 2021-10-01 RX ORDER — ATORVASTATIN CALCIUM 20 MG/1
20 TABLET, FILM COATED ORAL DAILY
Qty: 90 TABLET | Refills: 3 | Status: SHIPPED | OUTPATIENT
Start: 2021-10-01 | End: 2022-10-12

## 2021-10-09 DIAGNOSIS — F41.0 PANIC ATTACK: ICD-10-CM

## 2021-10-11 RX ORDER — VALACYCLOVIR HYDROCHLORIDE 1 G/1
TABLET, FILM COATED ORAL
Qty: 240 TABLET | Refills: 1 | Status: SHIPPED | OUTPATIENT
Start: 2021-10-11

## 2021-10-11 RX ORDER — ALPRAZOLAM 0.25 MG/1
TABLET ORAL
Qty: 30 TABLET | Refills: 0 | Status: SHIPPED | OUTPATIENT
Start: 2021-10-11 | End: 2021-12-30

## 2021-10-29 RX ORDER — SIMVASTATIN 20 MG
TABLET ORAL
Qty: 90 TABLET | Refills: 3 | Status: SHIPPED | OUTPATIENT
Start: 2021-10-29 | End: 2022-10-12

## 2021-11-02 ENCOUNTER — HOSPITAL ENCOUNTER (OUTPATIENT)
Dept: MAMMOGRAPHY | Facility: HOSPITAL | Age: 61
Discharge: HOME | End: 2021-11-02
Admitting: INTERNAL MEDICINE

## 2021-11-02 DIAGNOSIS — Z12.31 BREAST CANCER SCREENING BY MAMMOGRAM: ICD-10-CM

## 2021-11-02 PROCEDURE — 77063 BREAST TOMOSYNTHESIS BI: CPT

## 2021-11-02 PROCEDURE — 77067 SCR MAMMO BI INCL CAD: CPT

## 2021-12-29 DIAGNOSIS — F41.0 PANIC ATTACK: ICD-10-CM

## 2021-12-30 RX ORDER — ALPRAZOLAM 0.25 MG/1
TABLET ORAL
Qty: 15 TABLET | Refills: 0 | Status: SHIPPED | OUTPATIENT
Start: 2021-12-30 | End: 2022-03-21

## 2021-12-30 RX ORDER — HYOSCYAMINE SULFATE 0.125 MG
TABLET ORAL
Qty: 30 TABLET | Refills: 0 | Status: SHIPPED | OUTPATIENT
Start: 2021-12-30 | End: 2022-03-21 | Stop reason: SDUPTHER

## 2022-03-19 DIAGNOSIS — F41.0 PANIC ATTACK: ICD-10-CM

## 2022-03-21 RX ORDER — HYOSCYAMINE SULFATE 0.125 MG
0.12 TABLET ORAL EVERY 6 HOURS PRN
Qty: 30 TABLET | Refills: 0 | Status: SHIPPED | OUTPATIENT
Start: 2022-03-21 | End: 2022-08-22

## 2022-03-21 RX ORDER — ALPRAZOLAM 0.25 MG/1
TABLET ORAL
Qty: 15 TABLET | Refills: 0 | Status: SHIPPED | OUTPATIENT
Start: 2022-03-21 | End: 2022-05-13

## 2022-04-08 ENCOUNTER — LAB (OUTPATIENT)
Dept: LAB | Facility: HOSPITAL | Age: 62
End: 2022-04-08

## 2022-04-08 DIAGNOSIS — E78.5 HYPERLIPIDEMIA, UNSPECIFIED HYPERLIPIDEMIA TYPE: Primary | ICD-10-CM

## 2022-04-08 LAB
ALBUMIN SERPL-MCNC: 4.8 G/DL (ref 3.5–5.2)
ALBUMIN/GLOB SERPL: 2.1 G/DL
ALP SERPL-CCNC: 67 U/L (ref 39–117)
ALT SERPL W P-5'-P-CCNC: 26 U/L (ref 1–33)
ANION GAP SERPL CALCULATED.3IONS-SCNC: 12.3 MMOL/L (ref 5–15)
AST SERPL-CCNC: 29 U/L (ref 1–32)
BILIRUB SERPL-MCNC: 0.8 MG/DL (ref 0–1.2)
BUN SERPL-MCNC: 14 MG/DL (ref 8–23)
BUN/CREAT SERPL: 23 (ref 7–25)
CALCIUM SPEC-SCNC: 9.9 MG/DL (ref 8.6–10.5)
CHLORIDE SERPL-SCNC: 101 MMOL/L (ref 98–107)
CHOLEST SERPL-MCNC: 199 MG/DL (ref 0–200)
CO2 SERPL-SCNC: 24.7 MMOL/L (ref 22–29)
CREAT SERPL-MCNC: 0.61 MG/DL (ref 0.57–1)
EGFRCR SERPLBLD CKD-EPI 2021: 100.6 ML/MIN/1.73
GLOBULIN UR ELPH-MCNC: 2.3 GM/DL
GLUCOSE SERPL-MCNC: 117 MG/DL (ref 65–99)
HDLC SERPL-MCNC: 86 MG/DL (ref 40–60)
LDLC SERPL CALC-MCNC: 92 MG/DL (ref 0–100)
LDLC/HDLC SERPL: 1.03 {RATIO}
POTASSIUM SERPL-SCNC: 4.2 MMOL/L (ref 3.5–5.2)
PROT SERPL-MCNC: 7.1 G/DL (ref 6–8.5)
SODIUM SERPL-SCNC: 138 MMOL/L (ref 136–145)
TRIGL SERPL-MCNC: 121 MG/DL (ref 0–150)
VLDLC SERPL-MCNC: 21 MG/DL (ref 5–40)

## 2022-04-08 PROCEDURE — 36415 COLL VENOUS BLD VENIPUNCTURE: CPT | Performed by: INTERNAL MEDICINE

## 2022-04-08 PROCEDURE — 80061 LIPID PANEL: CPT | Performed by: INTERNAL MEDICINE

## 2022-04-08 PROCEDURE — 80053 COMPREHEN METABOLIC PANEL: CPT | Performed by: INTERNAL MEDICINE

## 2022-05-12 DIAGNOSIS — F41.0 PANIC ATTACK: ICD-10-CM

## 2022-05-13 RX ORDER — ALPRAZOLAM 0.25 MG/1
TABLET ORAL
Qty: 15 TABLET | Refills: 0 | Status: SHIPPED | OUTPATIENT
Start: 2022-05-13 | End: 2022-08-01

## 2022-05-16 RX ORDER — TRAZODONE HYDROCHLORIDE 50 MG/1
TABLET ORAL
Qty: 120 TABLET | Refills: 0 | Status: SHIPPED | OUTPATIENT
Start: 2022-05-16 | End: 2022-09-12

## 2022-08-01 DIAGNOSIS — F41.0 PANIC ATTACK: ICD-10-CM

## 2022-08-01 RX ORDER — ALPRAZOLAM 0.25 MG/1
TABLET ORAL
Qty: 15 TABLET | Refills: 0 | Status: SHIPPED | OUTPATIENT
Start: 2022-08-01 | End: 2022-11-21 | Stop reason: SDUPTHER

## 2022-08-22 RX ORDER — HYOSCYAMINE SULFATE 0.125 MG
TABLET ORAL
Qty: 30 TABLET | Refills: 0 | Status: SHIPPED | OUTPATIENT
Start: 2022-08-22 | End: 2022-10-12

## 2022-09-01 ENCOUNTER — TELEPHONE (OUTPATIENT)
Dept: INTERNAL MEDICINE | Facility: CLINIC | Age: 62
End: 2022-09-01

## 2022-09-01 NOTE — TELEPHONE ENCOUNTER
Caller: Sammi Stein    Relationship to patient: Self    Best call back number: [phone]    Type of visit: LABS    Requested date: WEEK PRIOR TO 11.21.22    If rescheduling, when is the original appointment: 10.03.22    Additional notes: PATIENT NEEDS TO RESCHEDULE LAB APPOINTMENT DUE TO RESCHEDULING PHYSICAL.     HUB ATTEMPTED TO WARM TRANSFER BUT WAS UNSUCCESSFUL.

## 2022-09-12 RX ORDER — TRAZODONE HYDROCHLORIDE 50 MG/1
TABLET ORAL
Qty: 120 TABLET | Refills: 0 | Status: SHIPPED | OUTPATIENT
Start: 2022-09-12 | End: 2023-01-06

## 2022-10-10 ENCOUNTER — TRANSCRIBE ORDERS (OUTPATIENT)
Dept: ADMINISTRATIVE | Facility: HOSPITAL | Age: 62
End: 2022-10-10

## 2022-10-10 DIAGNOSIS — Z92.89 HISTORY OF MAMMOGRAPHY, SCREENING: Primary | ICD-10-CM

## 2022-10-12 RX ORDER — HYOSCYAMINE SULFATE 0.125 MG
TABLET ORAL
Qty: 30 TABLET | Refills: 0 | Status: SHIPPED | OUTPATIENT
Start: 2022-10-12 | End: 2023-01-06

## 2022-10-12 RX ORDER — SIMVASTATIN 20 MG
TABLET ORAL
Qty: 90 TABLET | Refills: 3 | Status: SHIPPED | OUTPATIENT
Start: 2022-10-12 | End: 2022-11-21

## 2022-11-04 ENCOUNTER — HOSPITAL ENCOUNTER (OUTPATIENT)
Dept: MAMMOGRAPHY | Facility: HOSPITAL | Age: 62
Discharge: HOME | End: 2022-11-04
Admitting: INTERNAL MEDICINE

## 2022-11-04 DIAGNOSIS — Z92.89 HISTORY OF MAMMOGRAPHY, SCREENING: ICD-10-CM

## 2022-11-04 PROCEDURE — 77067 SCR MAMMO BI INCL CAD: CPT

## 2022-11-04 PROCEDURE — 77063 BREAST TOMOSYNTHESIS BI: CPT

## 2022-11-14 ENCOUNTER — TELEPHONE (OUTPATIENT)
Dept: INTERNAL MEDICINE | Facility: CLINIC | Age: 62
End: 2022-11-14

## 2022-11-14 ENCOUNTER — LAB (OUTPATIENT)
Dept: LAB | Facility: HOSPITAL | Age: 62
End: 2022-11-14

## 2022-11-14 DIAGNOSIS — Z00.00 HEALTH MAINTENANCE EXAMINATION: Primary | ICD-10-CM

## 2022-11-14 LAB
ALBUMIN SERPL-MCNC: 4.6 G/DL (ref 3.5–5.2)
ALBUMIN/GLOB SERPL: 1.9 G/DL
ALP SERPL-CCNC: 71 U/L (ref 39–117)
ALT SERPL W P-5'-P-CCNC: 49 U/L (ref 1–33)
ANION GAP SERPL CALCULATED.3IONS-SCNC: 14.5 MMOL/L (ref 5–15)
AST SERPL-CCNC: 63 U/L (ref 1–32)
BACTERIA UR QL AUTO: ABNORMAL /HPF
BASOPHILS # BLD AUTO: 0.04 10*3/MM3 (ref 0–0.2)
BASOPHILS NFR BLD AUTO: 0.7 % (ref 0–1.5)
BILIRUB SERPL-MCNC: 0.6 MG/DL (ref 0–1.2)
BILIRUB UR QL STRIP: NEGATIVE
BUN SERPL-MCNC: 14 MG/DL (ref 8–23)
BUN/CREAT SERPL: 21.5 (ref 7–25)
CALCIUM SPEC-SCNC: 9.8 MG/DL (ref 8.6–10.5)
CHLORIDE SERPL-SCNC: 106 MMOL/L (ref 98–107)
CHOLEST SERPL-MCNC: 238 MG/DL (ref 0–200)
CLARITY UR: CLEAR
CO2 SERPL-SCNC: 24.5 MMOL/L (ref 22–29)
COLOR UR: YELLOW
CREAT SERPL-MCNC: 0.65 MG/DL (ref 0.57–1)
DEPRECATED RDW RBC AUTO: 42.1 FL (ref 37–54)
EGFRCR SERPLBLD CKD-EPI 2021: 98.5 ML/MIN/1.73
EOSINOPHIL # BLD AUTO: 0.04 10*3/MM3 (ref 0–0.4)
EOSINOPHIL NFR BLD AUTO: 0.7 % (ref 0.3–6.2)
ERYTHROCYTE [DISTWIDTH] IN BLOOD BY AUTOMATED COUNT: 11.9 % (ref 12.3–15.4)
GLOBULIN UR ELPH-MCNC: 2.4 GM/DL
GLUCOSE SERPL-MCNC: 83 MG/DL (ref 65–99)
GLUCOSE UR STRIP-MCNC: NEGATIVE MG/DL
HCT VFR BLD AUTO: 37.4 % (ref 34–46.6)
HDLC SERPL-MCNC: 126 MG/DL (ref 40–60)
HGB BLD-MCNC: 13.8 G/DL (ref 12–15.9)
HGB UR QL STRIP.AUTO: NEGATIVE
HYALINE CASTS UR QL AUTO: ABNORMAL /LPF
IMM GRANULOCYTES # BLD AUTO: 0.03 10*3/MM3 (ref 0–0.05)
IMM GRANULOCYTES NFR BLD AUTO: 0.5 % (ref 0–0.5)
KETONES UR QL STRIP: NEGATIVE
LDLC SERPL CALC-MCNC: 92 MG/DL (ref 0–100)
LDLC/HDLC SERPL: 0.69 {RATIO}
LEUKOCYTE ESTERASE UR QL STRIP.AUTO: ABNORMAL
LYMPHOCYTES # BLD AUTO: 3.04 10*3/MM3 (ref 0.7–3.1)
LYMPHOCYTES NFR BLD AUTO: 53.7 % (ref 19.6–45.3)
MCH RBC QN AUTO: 36.5 PG (ref 26.6–33)
MCHC RBC AUTO-ENTMCNC: 36.9 G/DL (ref 31.5–35.7)
MCV RBC AUTO: 98.9 FL (ref 79–97)
MONOCYTES # BLD AUTO: 0.38 10*3/MM3 (ref 0.1–0.9)
MONOCYTES NFR BLD AUTO: 6.7 % (ref 5–12)
NEUTROPHILS NFR BLD AUTO: 2.13 10*3/MM3 (ref 1.7–7)
NEUTROPHILS NFR BLD AUTO: 37.7 % (ref 42.7–76)
NITRITE UR QL STRIP: NEGATIVE
NRBC BLD AUTO-RTO: 0 /100 WBC (ref 0–0.2)
PH UR STRIP.AUTO: 5.5 [PH] (ref 5–8)
PLATELET # BLD AUTO: 162 10*3/MM3 (ref 140–450)
PMV BLD AUTO: 10 FL (ref 6–12)
POTASSIUM SERPL-SCNC: 4.1 MMOL/L (ref 3.5–5.2)
PROT SERPL-MCNC: 7 G/DL (ref 6–8.5)
PROT UR QL STRIP: NEGATIVE
RBC # BLD AUTO: 3.78 10*6/MM3 (ref 3.77–5.28)
RBC # UR STRIP: ABNORMAL /HPF
REF LAB TEST METHOD: ABNORMAL
SODIUM SERPL-SCNC: 145 MMOL/L (ref 136–145)
SP GR UR STRIP: 1.02 (ref 1–1.03)
SQUAMOUS #/AREA URNS HPF: ABNORMAL /HPF
TRIGL SERPL-MCNC: 124 MG/DL (ref 0–150)
TSH SERPL DL<=0.05 MIU/L-ACNC: 1.43 UIU/ML (ref 0.27–4.2)
UROBILINOGEN UR QL STRIP: ABNORMAL
VLDLC SERPL-MCNC: 20 MG/DL (ref 5–40)
WBC # UR STRIP: ABNORMAL /HPF
WBC NRBC COR # BLD: 5.66 10*3/MM3 (ref 3.4–10.8)

## 2022-11-14 PROCEDURE — 84443 ASSAY THYROID STIM HORMONE: CPT | Performed by: INTERNAL MEDICINE

## 2022-11-14 PROCEDURE — 36415 COLL VENOUS BLD VENIPUNCTURE: CPT | Performed by: INTERNAL MEDICINE

## 2022-11-14 PROCEDURE — 81001 URINALYSIS AUTO W/SCOPE: CPT | Performed by: INTERNAL MEDICINE

## 2022-11-14 PROCEDURE — 80061 LIPID PANEL: CPT | Performed by: INTERNAL MEDICINE

## 2022-11-14 PROCEDURE — 85025 COMPLETE CBC W/AUTO DIFF WBC: CPT | Performed by: INTERNAL MEDICINE

## 2022-11-14 PROCEDURE — 80053 COMPREHEN METABOLIC PANEL: CPT | Performed by: INTERNAL MEDICINE

## 2022-11-14 NOTE — TELEPHONE ENCOUNTER
Patient is at [facility] wanting to get her labs done for today . There are no orders in the chart for her lab draw     She had a appt here but [facility] wanted to cancel it so she could get it done out that way

## 2022-11-21 ENCOUNTER — OFFICE VISIT (OUTPATIENT)
Dept: INTERNAL MEDICINE | Facility: CLINIC | Age: 62
End: 2022-11-21

## 2022-11-21 VITALS
HEIGHT: 64 IN | DIASTOLIC BLOOD PRESSURE: 80 MMHG | OXYGEN SATURATION: 98 % | BODY MASS INDEX: 23.39 KG/M2 | WEIGHT: 137 LBS | HEART RATE: 78 BPM | SYSTOLIC BLOOD PRESSURE: 130 MMHG

## 2022-11-21 DIAGNOSIS — Z00.00 WELL ADULT EXAM: Primary | ICD-10-CM

## 2022-11-21 DIAGNOSIS — F41.0 PANIC ATTACK: ICD-10-CM

## 2022-11-21 DIAGNOSIS — R94.5 ABNORMAL RESULTS OF LIVER FUNCTION STUDIES: ICD-10-CM

## 2022-11-21 DIAGNOSIS — M79.672 PAIN IN BOTH FEET: ICD-10-CM

## 2022-11-21 DIAGNOSIS — M79.671 PAIN IN BOTH FEET: ICD-10-CM

## 2022-11-21 PROCEDURE — 99396 PREV VISIT EST AGE 40-64: CPT | Performed by: INTERNAL MEDICINE

## 2022-11-21 RX ORDER — ATORVASTATIN CALCIUM 20 MG/1
20 TABLET, FILM COATED ORAL DAILY
Qty: 90 TABLET | Refills: 3 | Status: SHIPPED | OUTPATIENT
Start: 2022-11-21

## 2022-11-21 RX ORDER — ALPRAZOLAM 0.25 MG/1
0.25 TABLET ORAL DAILY PRN
Qty: 30 TABLET | Refills: 0 | Status: SHIPPED | OUTPATIENT
Start: 2022-11-21 | End: 2023-02-03

## 2022-11-21 NOTE — PROGRESS NOTES
Subjective     Sammi Stein is a 62 y.o. female who presents for a complete physical exam.      History of Present Illness     The following data was reviewed by: Christine Morris MD on 11/21/2022:  Common labs    Common Labs 4/8/22 4/8/22 11/14/22 11/14/22 11/14/22    0958 0958 1045 1045 1045   Glucose  117 (A)  83    BUN  14  14    Creatinine  0.61  0.65    Sodium  138  145    Potassium  4.2  4.1    Chloride  101  106    Calcium  9.9  9.8    Albumin  4.80  4.60    Total Bilirubin  0.8  0.6    Alkaline Phosphatase  67  71    AST (SGOT)  29  63 (A)    ALT (SGPT)  26  49 (A)    WBC   5.66     Hemoglobin   13.8     Hematocrit   37.4     Platelets   162     Total Cholesterol 199    238 (A)   Triglycerides 121    124   HDL Cholesterol 86 (A)    126 (A)   LDL Cholesterol  92    92   (A) Abnormal value            HLD.  Cholesterol is elevated but good ratios.      Mild increase LFTs.  She was at a celebration right before labs done and drank more than normal.      Feet pain/burning off and on.      Anxiety.   Prn Xanax works well.      Review of Systems   Respiratory: Negative.    Cardiovascular: Negative.        The following portions of the patient's history were reviewed and updated as appropriate: allergies, current medications, past family history, past medical history, past social history, past surgical history and problem list.  Health maintenance tab was reviewed and updated with the patient.       Patient Active Problem List    Diagnosis Date Noted   • Panic attack 01/22/2016     Note Last Updated: 8/16/2016     Description: Rare use of Xanax.  Contract on 8/16/2016     • Irritable bowel 01/22/2016   • HLD (hyperlipidemia) 01/22/2016   • Gastroesophageal reflux disease with esophagitis 01/22/2016       Past Medical History:   Diagnosis Date   • Abnormal EKG     Anterior T wave changes at baseline   • Abnormal liver function test    • Acid reflux    • Blood tests for routine general physical examination    •  Elevated cholesterol    • Gastritis    • Gastritis    • GERD (gastroesophageal reflux disease)    • Herpes labialis    • Herpes labialis    • Hyperlipidemia    • IBS (irritable bowel syndrome)    • Panic attack     Rare use of Xanax.  Contract on 8/6/14   • SVT (supraventricular tachycardia) (HCC)     Holter on 8/23/16 with several brief runs of SVT of up to 7 beats       Past Surgical History:   Procedure Laterality Date   • BLADDER SURGERY     • BREAST CYST ASPIRATION Right     about 20 years ago   • CHOLECYSTECTOMY     • SALPINGO OOPHORECTOMY     • TOTAL ABDOMINAL HYSTERECTOMY  1992    done for fibroids   • TOTAL ABDOMINAL HYSTERECTOMY WITH SALPINGO OOPHORECTOMY  1992       Family History   Problem Relation Age of Onset   • Diabetes Father    • Heart attack Father         Father with fatal MI at 76   • Diabetes Sister         type 2, two sisters   • Rectal cancer Brother    • Rheumatic fever Brother    • Diabetes Sister    • Stroke Sister    • Ovarian cancer Maternal Aunt    • Heart attack Mother    • Breast cancer Neg Hx        Social History     Socioeconomic History   • Marital status:    Tobacco Use   • Smoking status: Never   • Smokeless tobacco: Never   Vaping Use   • Vaping Use: Never used   Substance and Sexual Activity   • Alcohol use: Yes     Comment: Socially    • Drug use: No   • Sexual activity: Defer       Current Outpatient Medications on File Prior to Visit   Medication Sig Dispense Refill   • hyoscyamine (ANASPAZ,LEVSIN) 0.125 MG tablet TAKE 1 TABLET BY MOUTH EVERY 6 HOURS AS NEEDED FOR CRAMPING 30 tablet 0   • Multiple Vitamins-Minerals (MULTIVITAMIN ADULT PO) Take  by mouth.     • traZODone (DESYREL) 50 MG tablet TABLET 1 TABLET BY MOUTH EVERY EVENING 120 tablet 0   • valACYclovir (VALTREX) 1000 MG tablet TAKE 1 TABLET BY MOUTH EVERY 12 HOURS. 240 tablet 1   • [DISCONTINUED] ALPRAZolam (XANAX) 0.25 MG tablet TAKE 1 TABLET BY MOUTH EVERY 6 TO 8 HOURS AS NEEDED 15 tablet 0   •  "[DISCONTINUED] simvastatin (ZOCOR) 20 MG tablet TAKE 1 TABLET BY MOUTH EVERY DAY 90 tablet 3     No current facility-administered medications on file prior to visit.       Allergies   Allergen Reactions   • Codeine Nausea Only       Immunization History   Administered Date(s) Administered   • HPV Quadrivalent 10/02/2006, 12/29/2006   • Hepatitis A 08/31/2018   • Tdap 08/16/2016       Objective     /80   Pulse 78   Ht 161.3 cm (63.5\")   Wt 62.1 kg (137 lb)   SpO2 98%   BMI 23.88 kg/m²     Physical Exam  Constitutional:       Appearance: She is well-developed.   HENT:      Head: Normocephalic and atraumatic.      Right Ear: Hearing, tympanic membrane and external ear normal.      Left Ear: Hearing, tympanic membrane and external ear normal.      Nose: Nose normal.   Neck:      Thyroid: No thyromegaly.   Cardiovascular:      Rate and Rhythm: Normal rate and regular rhythm.      Heart sounds: Normal heart sounds. No murmur heard.  Pulmonary:      Effort: Pulmonary effort is normal.      Breath sounds: Normal breath sounds.   Abdominal:      General: There is no distension.      Palpations: Abdomen is soft.      Tenderness: There is no abdominal tenderness.   Musculoskeletal:      Cervical back: Neck supple.   Lymphadenopathy:      Cervical: No cervical adenopathy.   Skin:     General: Skin is warm and dry.   Neurological:      Mental Status: She is alert and oriented to person, place, and time.      Sensory: Sensation is intact.   Psychiatric:         Speech: Speech normal.         Behavior: Behavior normal.         Thought Content: Thought content normal.         Judgment: Judgment normal.         Assessment & Plan   Diagnoses and all orders for this visit:    1. Well adult exam (Primary)    2. Panic attack  -     ALPRAZolam (XANAX) 0.25 MG tablet; Take 1 tablet by mouth Daily As Needed for Anxiety.  Dispense: 30 tablet; Refill: 0    3. Abnormal results of liver function studies  -     Hepatic Function " Panel    4. Pain in both feet  -     Vitamin B12    Other orders  -     atorvastatin (LIPITOR) 20 MG tablet; Take 1 tablet by mouth Daily.  Dispense: 90 tablet; Refill: 3        Discussion    Patient presents today for a CPE.      Patient follows a healthy diet.   Patient follows an adequate exercise regimen. Mammogram is up to date.  She will call Dr. Shafer regarding colonoscopy.   Pap smear no longer performed secondary to hysterectomy.  I have recommended that the patient get the following immunizations:  Shingrix, flu and COVID-19.  Panic attacks.  Update contract for Xanax.  KHANG.  Change to atorvastatin a more optimal statin.    Feet pain/burning.  Check B12 today.  Offered EMG/NCS today.   Currently declines at this time.          Health Maintenance   Topic Date Due   • ZOSTER VACCINE (1 of 2) Never done   • COLORECTAL CANCER SCREENING  03/26/2020   • ANNUAL PHYSICAL  10/02/2022   • COVID-19 Vaccine (1) 11/23/2022 (Originally 1/15/1961)   • INFLUENZA VACCINE  03/31/2023 (Originally 8/1/2022)   • LIPID PANEL  11/14/2023   • MAMMOGRAM  11/04/2024   • TDAP/TD VACCINES (2 - Td or Tdap) 08/16/2026   • HEPATITIS C SCREENING  Addressed   • Pneumococcal Vaccine 0-64  Aged Out   • PAP SMEAR  Discontinued            No future appointments.

## 2022-11-22 LAB
ALBUMIN SERPL-MCNC: 4.7 G/DL (ref 3.8–4.8)
ALP SERPL-CCNC: 73 IU/L (ref 44–121)
ALT SERPL-CCNC: 49 IU/L (ref 0–32)
AST SERPL-CCNC: 57 IU/L (ref 0–40)
BILIRUB DIRECT SERPL-MCNC: 0.24 MG/DL (ref 0–0.4)
BILIRUB SERPL-MCNC: 0.7 MG/DL (ref 0–1.2)
PROT SERPL-MCNC: 7.1 G/DL (ref 6–8.5)
VIT B12 SERPL-MCNC: 772 PG/ML (ref 232–1245)

## 2022-11-30 DIAGNOSIS — R94.5 ABNORMAL RESULTS OF LIVER FUNCTION STUDIES: Primary | ICD-10-CM

## 2023-01-06 RX ORDER — HYOSCYAMINE SULFATE 0.125 MG
TABLET ORAL
Qty: 30 TABLET | Refills: 0 | Status: SHIPPED | OUTPATIENT
Start: 2023-01-06

## 2023-01-06 RX ORDER — TRAZODONE HYDROCHLORIDE 50 MG/1
TABLET ORAL
Qty: 120 TABLET | Refills: 0 | Status: SHIPPED | OUTPATIENT
Start: 2023-01-06

## 2023-02-03 DIAGNOSIS — F41.0 PANIC ATTACK: ICD-10-CM

## 2023-02-03 RX ORDER — ALPRAZOLAM 0.25 MG/1
0.25 TABLET ORAL DAILY PRN
Qty: 30 TABLET | Refills: 0 | Status: SHIPPED | OUTPATIENT
Start: 2023-02-03

## 2023-05-01 RX ORDER — TRAZODONE HYDROCHLORIDE 50 MG/1
TABLET ORAL
Qty: 120 TABLET | Refills: 0 | Status: SHIPPED | OUTPATIENT
Start: 2023-05-01

## 2023-05-15 DIAGNOSIS — F41.0 PANIC ATTACK: ICD-10-CM

## 2023-05-15 RX ORDER — ALPRAZOLAM 0.25 MG/1
0.25 TABLET ORAL DAILY PRN
Qty: 30 TABLET | Refills: 0 | Status: SHIPPED | OUTPATIENT
Start: 2023-05-15

## 2023-05-18 DIAGNOSIS — E78.5 HYPERLIPIDEMIA, UNSPECIFIED HYPERLIPIDEMIA TYPE: Primary | ICD-10-CM

## 2023-08-11 RX ORDER — ATORVASTATIN CALCIUM 20 MG/1
20 TABLET, FILM COATED ORAL DAILY
Qty: 90 TABLET | Refills: 3 | Status: SHIPPED | OUTPATIENT
Start: 2023-08-11

## 2023-08-11 NOTE — TELEPHONE ENCOUNTER
Caller: Sammi Stein    Relationship: Self    Best call back number: [phone]    Requested Prescriptions:   Requested Prescriptions     Pending Prescriptions Disp Refills    atorvastatin (LIPITOR) 20 MG tablet 90 tablet 3     Sig: Take 1 tablet by mouth Daily.        Pharmacy where request should be sent: [facility] [address] [facility] - [address] AT [address] TO [facility] - [phone]  - [phone] FX     Last office visit with prescribing clinician: 11/21/2022   Last telemedicine visit with prescribing clinician: Visit date not found   Next office visit with prescribing clinician: 11/27/2023     Additional details provided by patient: NEW PHARMACY     Does the patient have less than a 3 day supply:  [x] Yes  [] No      José Justice   08/11/23 16:00 EDT

## 2023-09-06 ENCOUNTER — TRANSCRIBE ORDERS (OUTPATIENT)
Dept: ADMINISTRATIVE | Facility: HOSPITAL | Age: 63
End: 2023-09-06
Payer: COMMERCIAL

## 2023-09-06 DIAGNOSIS — Z12.31 VISIT FOR SCREENING MAMMOGRAM: Primary | ICD-10-CM

## 2023-10-10 DIAGNOSIS — Z00.00 HEALTH MAINTENANCE EXAMINATION: Primary | ICD-10-CM

## 2023-10-11 ENCOUNTER — LAB (OUTPATIENT)
Dept: LAB | Facility: HOSPITAL | Age: 63
End: 2023-10-11
Payer: COMMERCIAL

## 2023-10-11 LAB
ALBUMIN SERPL-MCNC: 5 G/DL (ref 3.5–5.2)
ALBUMIN/GLOB SERPL: 1.9 G/DL
ALP SERPL-CCNC: 62 U/L (ref 39–117)
ALT SERPL W P-5'-P-CCNC: 19 U/L (ref 1–33)
ANION GAP SERPL CALCULATED.3IONS-SCNC: 12 MMOL/L (ref 5–15)
AST SERPL-CCNC: 26 U/L (ref 1–32)
BACTERIA UR QL AUTO: ABNORMAL /HPF
BASOPHILS # BLD AUTO: 0.04 10*3/MM3 (ref 0–0.2)
BASOPHILS NFR BLD AUTO: 0.5 % (ref 0–1.5)
BILIRUB CONJ SERPL-MCNC: 0.3 MG/DL (ref 0–0.3)
BILIRUB SERPL-MCNC: 1 MG/DL (ref 0–1.2)
BILIRUB UR QL STRIP: NEGATIVE
BUN SERPL-MCNC: 10 MG/DL (ref 8–23)
BUN/CREAT SERPL: 16.4 (ref 7–25)
CALCIUM SPEC-SCNC: 10.4 MG/DL (ref 8.6–10.5)
CHLORIDE SERPL-SCNC: 100 MMOL/L (ref 98–107)
CHOLEST SERPL-MCNC: 302 MG/DL (ref 0–200)
CLARITY UR: CLEAR
CO2 SERPL-SCNC: 27 MMOL/L (ref 22–29)
COLOR UR: ABNORMAL
CREAT SERPL-MCNC: 0.61 MG/DL (ref 0.57–1)
DEPRECATED RDW RBC AUTO: 42.3 FL (ref 37–54)
EGFRCR SERPLBLD CKD-EPI 2021: 99.4 ML/MIN/1.73
EOSINOPHIL # BLD AUTO: 0.05 10*3/MM3 (ref 0–0.4)
EOSINOPHIL NFR BLD AUTO: 0.6 % (ref 0.3–6.2)
ERYTHROCYTE [DISTWIDTH] IN BLOOD BY AUTOMATED COUNT: 11.8 % (ref 12.3–15.4)
FERRITIN SERPL-MCNC: 367 NG/ML (ref 13–150)
GLOBULIN UR ELPH-MCNC: 2.7 GM/DL
GLUCOSE SERPL-MCNC: 110 MG/DL (ref 65–99)
GLUCOSE UR STRIP-MCNC: NEGATIVE MG/DL
HAV IGM SERPL QL IA: NORMAL
HBV CORE IGM SERPL QL IA: NORMAL
HBV SURFACE AG SERPL QL IA: NORMAL
HCT VFR BLD AUTO: 41.4 % (ref 34–46.6)
HCV AB SER DONR QL: NORMAL
HDLC SERPL-MCNC: 110 MG/DL (ref 40–60)
HGB BLD-MCNC: 14.8 G/DL (ref 12–15.9)
HGB UR QL STRIP.AUTO: NEGATIVE
HYALINE CASTS UR QL AUTO: ABNORMAL /LPF
IMM GRANULOCYTES # BLD AUTO: 0.04 10*3/MM3 (ref 0–0.05)
IMM GRANULOCYTES NFR BLD AUTO: 0.5 % (ref 0–0.5)
IRON 24H UR-MRATE: 76 MCG/DL (ref 37–145)
IRON SATN MFR SERPL: 22 % (ref 20–50)
KETONES UR QL STRIP: NEGATIVE
LDLC SERPL CALC-MCNC: 177 MG/DL (ref 0–100)
LDLC/HDLC SERPL: 1.57 {RATIO}
LEUKOCYTE ESTERASE UR QL STRIP.AUTO: NEGATIVE
LYMPHOCYTES # BLD AUTO: 3.28 10*3/MM3 (ref 0.7–3.1)
LYMPHOCYTES NFR BLD AUTO: 38.7 % (ref 19.6–45.3)
MCH RBC QN AUTO: 35.7 PG (ref 26.6–33)
MCHC RBC AUTO-ENTMCNC: 35.7 G/DL (ref 31.5–35.7)
MCV RBC AUTO: 99.8 FL (ref 79–97)
MONOCYTES # BLD AUTO: 0.67 10*3/MM3 (ref 0.1–0.9)
MONOCYTES NFR BLD AUTO: 7.9 % (ref 5–12)
NEUTROPHILS NFR BLD AUTO: 4.39 10*3/MM3 (ref 1.7–7)
NEUTROPHILS NFR BLD AUTO: 51.8 % (ref 42.7–76)
NITRITE UR QL STRIP: NEGATIVE
NRBC BLD AUTO-RTO: 0.1 /100 WBC (ref 0–0.2)
PH UR STRIP.AUTO: 6.5 [PH] (ref 5–8)
PLATELET # BLD AUTO: 187 10*3/MM3 (ref 140–450)
PMV BLD AUTO: 10.7 FL (ref 6–12)
POTASSIUM SERPL-SCNC: 3.9 MMOL/L (ref 3.5–5.2)
PROT SERPL-MCNC: 7.7 G/DL (ref 6–8.5)
PROT UR QL STRIP: NEGATIVE
RBC # BLD AUTO: 4.15 10*6/MM3 (ref 3.77–5.28)
RBC # UR STRIP: ABNORMAL /HPF
REF LAB TEST METHOD: ABNORMAL
SODIUM SERPL-SCNC: 139 MMOL/L (ref 136–145)
SP GR UR STRIP: 1.02 (ref 1–1.03)
SQUAMOUS #/AREA URNS HPF: ABNORMAL /HPF
TIBC SERPL-MCNC: 353 MCG/DL (ref 298–536)
TRANSFERRIN SERPL-MCNC: 237 MG/DL (ref 200–360)
TRIGL SERPL-MCNC: 95 MG/DL (ref 0–150)
TSH SERPL DL<=0.05 MIU/L-ACNC: 3.22 UIU/ML (ref 0.27–4.2)
UROBILINOGEN UR QL STRIP: ABNORMAL
VLDLC SERPL-MCNC: 15 MG/DL (ref 5–40)
WBC # UR STRIP: ABNORMAL /HPF
WBC NRBC COR # BLD: 8.47 10*3/MM3 (ref 3.4–10.8)

## 2023-10-11 PROCEDURE — 80053 COMPREHEN METABOLIC PANEL: CPT | Performed by: INTERNAL MEDICINE

## 2023-10-11 PROCEDURE — 84466 ASSAY OF TRANSFERRIN: CPT | Performed by: INTERNAL MEDICINE

## 2023-10-11 PROCEDURE — 36415 COLL VENOUS BLD VENIPUNCTURE: CPT | Performed by: INTERNAL MEDICINE

## 2023-10-11 PROCEDURE — 81001 URINALYSIS AUTO W/SCOPE: CPT | Performed by: INTERNAL MEDICINE

## 2023-10-11 PROCEDURE — 84443 ASSAY THYROID STIM HORMONE: CPT | Performed by: INTERNAL MEDICINE

## 2023-10-11 PROCEDURE — 83540 ASSAY OF IRON: CPT | Performed by: INTERNAL MEDICINE

## 2023-10-11 PROCEDURE — 82728 ASSAY OF FERRITIN: CPT | Performed by: INTERNAL MEDICINE

## 2023-10-11 PROCEDURE — 82248 BILIRUBIN DIRECT: CPT | Performed by: INTERNAL MEDICINE

## 2023-10-11 PROCEDURE — 80061 LIPID PANEL: CPT | Performed by: INTERNAL MEDICINE

## 2023-10-11 PROCEDURE — 80074 ACUTE HEPATITIS PANEL: CPT | Performed by: INTERNAL MEDICINE

## 2023-10-11 PROCEDURE — 85025 COMPLETE CBC W/AUTO DIFF WBC: CPT | Performed by: INTERNAL MEDICINE

## 2023-10-18 ENCOUNTER — OFFICE VISIT (OUTPATIENT)
Dept: INTERNAL MEDICINE | Facility: CLINIC | Age: 63
End: 2023-10-18
Payer: COMMERCIAL

## 2023-10-18 VITALS
OXYGEN SATURATION: 98 % | DIASTOLIC BLOOD PRESSURE: 86 MMHG | BODY MASS INDEX: 22.02 KG/M2 | HEIGHT: 64 IN | SYSTOLIC BLOOD PRESSURE: 134 MMHG | HEART RATE: 76 BPM | WEIGHT: 129 LBS

## 2023-10-18 DIAGNOSIS — Z00.00 WELL ADULT EXAM: Primary | ICD-10-CM

## 2023-10-18 DIAGNOSIS — M25.552 BILATERAL HIP PAIN: ICD-10-CM

## 2023-10-18 DIAGNOSIS — Z78.0 MENOPAUSE: ICD-10-CM

## 2023-10-18 DIAGNOSIS — K62.5 RECTAL BLEEDING: ICD-10-CM

## 2023-10-18 DIAGNOSIS — M25.551 BILATERAL HIP PAIN: ICD-10-CM

## 2023-10-18 DIAGNOSIS — Z13.820 SCREENING FOR OSTEOPOROSIS: ICD-10-CM

## 2023-10-18 PROBLEM — F51.04 CHRONIC INSOMNIA: Status: ACTIVE | Noted: 2023-10-18

## 2023-10-18 PROCEDURE — 99397 PER PM REEVAL EST PAT 65+ YR: CPT | Performed by: INTERNAL MEDICINE

## 2023-10-21 NOTE — PROGRESS NOTES
Subjective     Sammi Stein is a 63 y.o. female who presents for a complete physical exam.      History of Present Illness     The following data was reviewed by: Christine Morris MD on 10/18/2023:  Common labs          11/14/2022    10:45 11/21/2022    09:01 10/11/2023    09:53   Common Labs   Glucose 83   110    BUN 14   10    Creatinine 0.65   0.61    Sodium 145   139    Potassium 4.1   3.9    Chloride 106   100    Calcium 9.8   10.4    Total Protein  7.1     Albumin 4.60  4.7  5.0    Total Bilirubin 0.6  0.7  1.0    Alkaline Phosphatase 71  73  62    AST (SGOT) 63  57  26    ALT (SGPT) 49  49  19    WBC 5.66   8.47    Hemoglobin 13.8   14.8    Hematocrit 37.4   41.4    Platelets 162   187    Total Cholesterol 238   302    Triglycerides 124   95    HDL Cholesterol 126   110    LDL Cholesterol  92   177        HLD.  Poor control.  She states she will get back on atorvastatin.      C/o bilateral hip pain.  Outside of hips.  Hurts to walk.  Hurts to lay on.      C/o intermittent rectal bleeding.  She has known hemorrhoids.        Review of Systems   Constitutional: Negative.    HENT: Negative.     Eyes: Negative.    Respiratory: Negative.     Cardiovascular: Negative.    Gastrointestinal: Negative.  Positive for anal bleeding.   Endocrine: Negative.    Genitourinary: Negative.    Musculoskeletal: Negative.  Positive for arthralgias.   Skin: Negative.    Allergic/Immunologic: Negative.    Neurological: Negative.    Hematological: Negative.    Psychiatric/Behavioral: Negative.         The following portions of the patient's history were reviewed and updated as appropriate: allergies, current medications, past family history, past medical history, past social history, past surgical history and problem list.  Health maintenance tab was reviewed and updated with the patient.       Patient Active Problem List    Diagnosis Date Noted    Chronic insomnia 10/18/2023    Panic attack 01/22/2016     Note Last Updated:  8/16/2016     Description: Rare use of Xanax.  Contract on 8/16/2016      Irritable bowel 01/22/2016    HLD (hyperlipidemia) 01/22/2016    Gastroesophageal reflux disease with esophagitis 01/22/2016       Past Medical History:   Diagnosis Date    Abnormal EKG     Anterior T wave changes at baseline    Abnormal liver function test     Acid reflux     Arthritis     Blood tests for routine general physical examination     Elevated cholesterol     Gastritis     Gastritis     GERD (gastroesophageal reflux disease)     Herpes labialis     Herpes labialis     Hyperlipidemia     IBS (irritable bowel syndrome)     Panic attack     Rare use of Xanax.  Contract on 8/6/14    SVT (supraventricular tachycardia)     Holter on 8/23/16 with several brief runs of SVT of up to 7 beats       Past Surgical History:   Procedure Laterality Date    BLADDER SURGERY      BREAST CYST ASPIRATION Right     about 20 years ago    CHOLECYSTECTOMY      COLONOSCOPY      SALPINGO OOPHORECTOMY      TOTAL ABDOMINAL HYSTERECTOMY  1992    done for fibroids    TOTAL ABDOMINAL HYSTERECTOMY WITH SALPINGO OOPHORECTOMY  1992       Family History   Problem Relation Age of Onset    Diabetes Father     Heart attack Father         Father with fatal MI at 76    Diabetes Sister         type 2, two sisters    Rectal cancer Brother     Rheumatic fever Brother     Diabetes Sister     Stroke Sister     Ovarian cancer Maternal Aunt     Heart attack Mother     Breast cancer Neg Hx        Social History     Socioeconomic History    Marital status:    Tobacco Use    Smoking status: Never    Smokeless tobacco: Never   Vaping Use    Vaping Use: Never used   Substance and Sexual Activity    Alcohol use: Yes     Comment: Socially     Drug use: No    Sexual activity: Yes     Partners: Male     Birth control/protection: Hysterectomy, Same-sex partner       Current Outpatient Medications on File Prior to Visit   Medication Sig Dispense Refill    ALPRAZolam (XANAX) 0.25 MG  "tablet Take 1 tablet by mouth Daily As Needed for Anxiety. 30 tablet 0    hyoscyamine (ANASPAZ,LEVSIN) 0.125 MG tablet Take 1 tablet by mouth Every 6 (Six) Hours As Needed for Cramping. 30 tablet 0    Multiple Vitamins-Minerals (MULTIVITAMIN ADULT PO) Take  by mouth.      traZODone (DESYREL) 50 MG tablet Take 1 tablet by mouth Every Evening. 90 tablet 3    valACYclovir (VALTREX) 1000 MG tablet TAKE 1 TABLET BY MOUTH EVERY 12 HOURS. 240 tablet 1    atorvastatin (LIPITOR) 20 MG tablet Take 1 tablet by mouth Daily. (Patient not taking: Reported on 10/18/2023) 90 tablet 3     No current facility-administered medications on file prior to visit.       Allergies   Allergen Reactions    Codeine Nausea Only       Immunization History   Administered Date(s) Administered    HPV Quadrivalent 10/02/2006, 12/29/2006    Hepatitis A 08/31/2018    Tdap 08/16/2016       Objective     /86   Pulse 76   Ht 161.3 cm (63.5\")   Wt 58.5 kg (129 lb)   SpO2 98%   BMI 22.49 kg/m²     Physical Exam  Constitutional:       Appearance: She is well-developed.   HENT:      Head: Normocephalic and atraumatic.      Right Ear: Hearing, tympanic membrane and external ear normal.      Left Ear: Hearing, tympanic membrane and external ear normal.      Nose: Nose normal.   Neck:      Thyroid: No thyromegaly.   Cardiovascular:      Rate and Rhythm: Normal rate and regular rhythm.      Heart sounds: Normal heart sounds. No murmur heard.  Pulmonary:      Effort: Pulmonary effort is normal.      Breath sounds: Normal breath sounds.   Abdominal:      General: There is no distension.      Palpations: Abdomen is soft.      Tenderness: There is no abdominal tenderness.   Genitourinary:     Rectum: Internal hemorrhoid present. No mass.   Musculoskeletal:      Cervical back: Neck supple.      Right hip: No deformity or bony tenderness. Normal range of motion.      Left hip: No deformity or bony tenderness. Normal range of motion.   Lymphadenopathy:      " Cervical: No cervical adenopathy.   Skin:     General: Skin is warm and dry.   Neurological:      Mental Status: She is alert and oriented to person, place, and time.   Psychiatric:         Speech: Speech normal.         Behavior: Behavior normal.         Thought Content: Thought content normal.         Judgment: Judgment normal.     X-rays of the bilateral hips  performed today for following indication:   pain.  X-ray reveal nad.  There is no available x-ray for comparison.  X-ray sent to radiology for official interpretation and findings.        Assessment & Plan   Diagnoses and all orders for this visit:    1. Well adult exam (Primary)    2. Bilateral hip pain  -     XR Hips Bilateral With or Without Pelvis 2 View    3. Rectal bleeding  -     Ambulatory Referral to Gastroenterology    4. Screening for osteoporosis  -     DEXA Bone Density Axial; Future    5. Menopause  -     DEXA Bone Density Axial; Future        Discussion    Patient presents today for a CPE.      Patient follows a healthy diet.   Patient follows an adequate exercise regimen. Mammogram is up to date.   Patient has been referred for colon cancer screening.   Pap smear no longer performed secondary to hysterectomy.   Discussed importance of preventative care including vaccinations, age appropriate cancer screening, routine lab work, healthy diet, and active lifestyle.  Rectal bleeding.  Likely hemorrhoids.  She is due for colonoscopy.  She is referred to Dr. Shafer who she has seen in the past.    Bilateral hip pain.  Offered trial of physical therapy.  She would like to hold off for now but will let me know.        Health Maintenance   Topic Date Due    COVID-19 Vaccine (1) Never done    ZOSTER VACCINE (1 of 2) Never done    DXA SCAN  02/09/2017    COLORECTAL CANCER SCREENING  03/26/2020    INFLUENZA VACCINE  03/31/2024 (Originally 8/1/2023)    Pneumococcal Vaccine 65+ (1 - PCV) 10/18/2024 (Originally 7/15/2023)    ANNUAL PHYSICAL  11/21/2023     LIPID PANEL  10/11/2024    MAMMOGRAM  11/04/2024    TDAP/TD VACCINES (2 - Td or Tdap) 08/16/2026    HEPATITIS C SCREENING  Completed    PAP SMEAR  Discontinued            Future Appointments   Date Time Provider Department Center   11/9/2023 11:00 AM [facility]   4/19/2024  1:20 PM [facility]   10/17/2024  1:00 PM [facility]   10/22/2024  1:30 PM Christine Morris MD [facility]

## 2023-11-07 DIAGNOSIS — F41.0 PANIC ATTACK: ICD-10-CM

## 2023-11-07 RX ORDER — ALPRAZOLAM 0.25 MG/1
TABLET ORAL
Qty: 30 TABLET | Refills: 0 | Status: SHIPPED | OUTPATIENT
Start: 2023-11-07

## 2023-11-09 ENCOUNTER — HOSPITAL ENCOUNTER (OUTPATIENT)
Dept: MAMMOGRAPHY | Facility: HOSPITAL | Age: 63
Discharge: HOME | End: 2023-11-09
Admitting: INTERNAL MEDICINE
Payer: COMMERCIAL

## 2023-11-09 DIAGNOSIS — Z12.31 VISIT FOR SCREENING MAMMOGRAM: ICD-10-CM

## 2023-11-09 PROCEDURE — 77063 BREAST TOMOSYNTHESIS BI: CPT

## 2023-11-09 PROCEDURE — 77067 SCR MAMMO BI INCL CAD: CPT

## 2023-11-12 ENCOUNTER — HOSPITAL ENCOUNTER (EMERGENCY)
Facility: HOSPITAL | Age: 63
Discharge: HOME | End: 2023-11-12
Attending: STUDENT IN AN ORGANIZED HEALTH CARE EDUCATION/TRAINING PROGRAM | Admitting: STUDENT IN AN ORGANIZED HEALTH CARE EDUCATION/TRAINING PROGRAM
Payer: COMMERCIAL

## 2023-11-12 VITALS
RESPIRATION RATE: 16 BRPM | BODY MASS INDEX: 22.02 KG/M2 | HEART RATE: 68 BPM | HEIGHT: 64 IN | SYSTOLIC BLOOD PRESSURE: 150 MMHG | DIASTOLIC BLOOD PRESSURE: 75 MMHG | WEIGHT: 129 LBS | OXYGEN SATURATION: 98 % | TEMPERATURE: 98.2 F

## 2023-11-12 DIAGNOSIS — M25.512 ACUTE PAIN OF LEFT SHOULDER: Primary | ICD-10-CM

## 2023-11-12 PROCEDURE — 99283 EMERGENCY DEPT VISIT LOW MDM: CPT

## 2023-11-12 RX ORDER — CYCLOBENZAPRINE HCL 5 MG
5 TABLET ORAL 3 TIMES DAILY PRN
Qty: 9 TABLET | Refills: 0 | Status: SHIPPED | OUTPATIENT
Start: 2023-11-12 | End: 2023-11-15

## 2023-11-12 RX ORDER — CYCLOBENZAPRINE HCL 10 MG
10 TABLET ORAL ONCE
Status: COMPLETED | OUTPATIENT
Start: 2023-11-12 | End: 2023-11-12

## 2023-11-12 RX ORDER — ACETAMINOPHEN 500 MG
1000 TABLET ORAL ONCE
Status: COMPLETED | OUTPATIENT
Start: 2023-11-12 | End: 2023-11-12

## 2023-11-12 RX ORDER — LIDOCAINE 50 MG/G
1 PATCH TOPICAL ONCE
Status: DISCONTINUED | OUTPATIENT
Start: 2023-11-12 | End: 2023-11-12 | Stop reason: HOSPADM

## 2023-11-12 RX ORDER — IBUPROFEN 600 MG/1
600 TABLET ORAL EVERY 6 HOURS PRN
COMMUNITY

## 2023-11-12 RX ORDER — LIDOCAINE 50 MG/G
1 PATCH TOPICAL EVERY 24 HOURS
Qty: 7 PATCH | Refills: 0 | Status: SHIPPED | OUTPATIENT
Start: 2023-11-12 | End: 2023-11-19

## 2023-11-12 RX ADMIN — ACETAMINOPHEN 1000 MG: 500 TABLET ORAL at 14:11

## 2023-11-12 RX ADMIN — CYCLOBENZAPRINE 10 MG: 10 TABLET, FILM COATED ORAL at 14:11

## 2023-11-12 RX ADMIN — LIDOCAINE 1 PATCH: 50 PATCH TOPICAL at 14:13

## 2023-11-12 NOTE — ED NOTES
Pt with left upper back pain that started while she was washing her hands. Left upper back area over scapula is tender to touch

## 2023-11-12 NOTE — ED PROVIDER NOTES
Subjective   History of Present Illness  Pt is a 63 y.o. female with PMH as listed who presents for   Chief Complaint   Patient presents with    Back Pain       Patient is a 63-year-old female presents for left shoulder pain x1 day.  States that she was occasion when she had sudden onset pain in her left shoulder/back with some associated nausea.  Denies any chest pain, shortness of breath, vomiting, diaphoresis.  Denies any new injuries or trauma to the area.  Has no other new complaints.  States that she took 4 tablets of 200 mg ibuprofen with improvement in her pain.  Was initially seen at urgent care and referred to the ED for further evaluation.  She has no other new complaints at this time.    Review of Systems    Past Medical History:   Diagnosis Date    Abnormal EKG     Anterior T wave changes at baseline    Abnormal liver function test     Acid reflux     Arthritis     Blood tests for routine general physical examination     Elevated cholesterol     Gastritis     Gastritis     GERD (gastroesophageal reflux disease)     Herpes labialis     Herpes labialis     Hyperlipidemia     IBS (irritable bowel syndrome)     Panic attack     Rare use of Xanax.  Contract on 8/6/14    SVT (supraventricular tachycardia)     Holter on 8/23/16 with several brief runs of SVT of up to 7 beats       Allergies   Allergen Reactions    Codeine Nausea Only       Past Surgical History:   Procedure Laterality Date    BLADDER SURGERY      BREAST CYST ASPIRATION Right     about 20 years ago    CHOLECYSTECTOMY      COLONOSCOPY      SALPINGO OOPHORECTOMY      TOTAL ABDOMINAL HYSTERECTOMY  1992    done for fibroids    TOTAL ABDOMINAL HYSTERECTOMY WITH SALPINGO OOPHORECTOMY  1992       Family History   Problem Relation Age of Onset    Diabetes Father     Heart attack Father         Father with fatal MI at 76    Diabetes Sister         type 2, two sisters    Rectal cancer Brother     Rheumatic fever Brother     Diabetes Sister     Stroke  Sister     Ovarian cancer Maternal Aunt     Heart attack Mother     Breast cancer Neg Hx        Social History     Socioeconomic History    Marital status:    Tobacco Use    Smoking status: Never    Smokeless tobacco: Never   Vaping Use    Vaping Use: Never used   Substance and Sexual Activity    Alcohol use: Yes     Comment: Socially     Drug use: No    Sexual activity: Yes     Partners: Male     Birth control/protection: Hysterectomy, Same-sex partner           Objective   Physical Exam  Constitutional:       Appearance: Normal appearance.   HENT:      Head: Normocephalic and atraumatic.      Mouth/Throat:      Mouth: Mucous membranes are moist.      Pharynx: Oropharynx is clear.   Eyes:      Conjunctiva/sclera: Conjunctivae normal.   Cardiovascular:      Rate and Rhythm: Normal rate.   Pulmonary:      Effort: Pulmonary effort is normal.   Abdominal:      General: Abdomen is flat.   Musculoskeletal:      Cervical back: Neck supple.      Comments: Tender to palpation over left trapezius with no midline C or T-spine tenderness.  Full range of motion left upper extremity, NVM intact left upper extremity.   Skin:     General: Skin is warm and dry.   Neurological:      Mental Status: She is alert.   Psychiatric:         Mood and Affect: Mood normal.         Procedures           ED Course  ED Course as of 11/12/23 1439   Sun Nov 12, 2023   1400 Patient resents for left shoulder pain.  Exam is significant for tenderness over left shoulder, no midline tenderness.  Will treat with lidocaine patch, Tylenol, and Flexeril.  Given that patient's pain improved with ibuprofen and no midline tenderness and no other associated symptoms we will treat with medications and reassess. [JF]   1438 Pain resolved with medication, given resolution of pain with medication, stable vitals, increased pain with range of motion of left shoulder low sufficient for cardiac or more insidious cause of patient's left shoulder/back pain.  Will  discharge patient with prescription for lidocaine patch and Flexeril and instructions not to drive or operate machinery while taking muscle relaxers.  Patient and  at bedside understand and agree with plan of care.  All questions answered. [JF]      ED Course User Index  [JF] Aba Moran MD                                           Medical Decision Making  My differential diagnosis of the upper extremity pain or injury includes but is not limited to contusions of the shoulder, forearm, arm, wrist, elbow or hand, dislocations of shoulder, elbow, wrist, digits, nursemaid's elbow (age-appropriate), shoulder sprain, elbow sprain, wrist sprain, digit sprain, shoulder strain, arm strain, forearm strain, elbow strain, wrist strain, hand sprain, digit strain, lacerations of the upper extremity, fractures both closed and open of clavicle, scapula, humerus, radius, ulna, bones of the wrist, hands and digits, cellulitis or abscess, cervical radiculopathy, radial nerve palsy, neurogenic upper extremity pain, angina equivalent, SVT, DVT, arterial occlusion, compartment syndrome.      Problems Addressed:  Acute pain of left shoulder: complicated acute illness or injury    Risk  OTC drugs.  Prescription drug management.        Final diagnoses:   Acute pain of left shoulder       ED Disposition  ED Disposition       ED Disposition   Discharge    Condition   Stable    Comment   --               Christine Morris MD  [address] [address]  [phone]    Schedule an appointment as soon as possible for a visit in 3 days  For re-evaluation         Medication List        New Prescriptions      cyclobenzaprine 5 MG tablet  Commonly known as: FLEXERIL  Take 1 tablet by mouth 3 (Three) Times a Day As Needed for Muscle Spasms for up to 3 days.     lidocaine 5 %  Commonly known as: LIDODERM  Place 1 patch on the skin as directed by provider Daily for 7 days. Remove & Discard patch within 12 hours or  as directed by MD               Where to Get Your Medications        These medications were sent to [facility] #[id number] - [place], [place] - [address] [address] AT [address] OF [address] [address] - [phone] [facility] [phone]   [address] [address], [facility] [address]      Phone: [phone]   cyclobenzaprine 5 MG tablet  lidocaine 5 %            Aba Moran MD  11/12/23 2399

## 2024-01-02 ENCOUNTER — OFFICE (OUTPATIENT)
Dept: URBAN - METROPOLITAN AREA CLINIC 66 | Facility: CLINIC | Age: 64
End: 2024-01-02

## 2024-01-02 VITALS
DIASTOLIC BLOOD PRESSURE: 82 MMHG | WEIGHT: 136 LBS | HEART RATE: 80 BPM | SYSTOLIC BLOOD PRESSURE: 173 MMHG | HEIGHT: 64 IN

## 2024-01-02 DIAGNOSIS — K21.9 GASTRO-ESOPHAGEAL REFLUX DISEASE WITHOUT ESOPHAGITIS: ICD-10-CM

## 2024-01-02 DIAGNOSIS — K58.9 IRRITABLE BOWEL SYNDROME WITHOUT DIARRHEA: ICD-10-CM

## 2024-01-02 DIAGNOSIS — Z80.0 FAMILY HISTORY OF MALIGNANT NEOPLASM OF DIGESTIVE ORGANS: ICD-10-CM

## 2024-01-02 DIAGNOSIS — K62.5 HEMORRHAGE OF ANUS AND RECTUM: ICD-10-CM

## 2024-01-02 PROCEDURE — 99204 OFFICE O/P NEW MOD 45 MIN: CPT | Performed by: NURSE PRACTITIONER

## 2024-01-02 RX ORDER — ONDANSETRON 4 MG/1
12 TABLET, ORALLY DISINTEGRATING ORAL
Qty: 30 | Refills: 4 | Status: ACTIVE
Start: 2024-01-02

## 2024-02-02 DIAGNOSIS — F41.0 PANIC ATTACK: ICD-10-CM

## 2024-02-02 RX ORDER — ALPRAZOLAM 0.25 MG/1
TABLET ORAL
Qty: 30 TABLET | Refills: 0 | Status: SHIPPED | OUTPATIENT
Start: 2024-02-02

## 2024-04-11 DIAGNOSIS — E78.5 HYPERLIPIDEMIA, UNSPECIFIED HYPERLIPIDEMIA TYPE: Primary | ICD-10-CM

## 2024-04-17 ENCOUNTER — LAB (OUTPATIENT)
Dept: LAB | Facility: HOSPITAL | Age: 64
End: 2024-04-17
Payer: COMMERCIAL

## 2024-04-17 DIAGNOSIS — R79.89 LOW SERUM CALCIUM: Primary | ICD-10-CM

## 2024-04-17 LAB
ALBUMIN SERPL-MCNC: 4.3 G/DL (ref 3.5–5.2)
ALBUMIN/GLOB SERPL: 1.6 G/DL
ALP SERPL-CCNC: 60 U/L (ref 39–117)
ALT SERPL W P-5'-P-CCNC: 19 U/L (ref 1–33)
ANION GAP SERPL CALCULATED.3IONS-SCNC: 12 MMOL/L (ref 5–15)
AST SERPL-CCNC: 23 U/L (ref 1–32)
BILIRUB SERPL-MCNC: 1 MG/DL (ref 0–1.2)
BUN SERPL-MCNC: 11 MG/DL (ref 8–23)
BUN/CREAT SERPL: 14.7 (ref 7–25)
CALCIUM SPEC-SCNC: 8.1 MG/DL (ref 8.6–10.5)
CHLORIDE SERPL-SCNC: 101 MMOL/L (ref 98–107)
CHOLEST SERPL-MCNC: 281 MG/DL (ref 0–200)
CO2 SERPL-SCNC: 24 MMOL/L (ref 22–29)
CREAT SERPL-MCNC: 0.75 MG/DL (ref 0.57–1)
EGFRCR SERPLBLD CKD-EPI 2021: 88.5 ML/MIN/1.73
GLOBULIN UR ELPH-MCNC: 2.7 GM/DL
GLUCOSE SERPL-MCNC: 115 MG/DL (ref 65–99)
HDLC SERPL-MCNC: 107 MG/DL (ref 40–60)
LDLC SERPL CALC-MCNC: 152 MG/DL (ref 0–100)
LDLC/HDLC SERPL: 1.38 {RATIO}
POTASSIUM SERPL-SCNC: 3.7 MMOL/L (ref 3.5–5.2)
PROT SERPL-MCNC: 7 G/DL (ref 6–8.5)
SODIUM SERPL-SCNC: 137 MMOL/L (ref 136–145)
TRIGL SERPL-MCNC: 133 MG/DL (ref 0–150)
VLDLC SERPL-MCNC: 22 MG/DL (ref 5–40)

## 2024-04-17 PROCEDURE — 80061 LIPID PANEL: CPT | Performed by: INTERNAL MEDICINE

## 2024-04-17 PROCEDURE — 80053 COMPREHEN METABOLIC PANEL: CPT | Performed by: INTERNAL MEDICINE

## 2024-04-22 RX ORDER — TRAZODONE HYDROCHLORIDE 50 MG/1
50 TABLET ORAL EVERY EVENING
Qty: 90 TABLET | Refills: 3 | Status: SHIPPED | OUTPATIENT
Start: 2024-04-22

## 2024-04-24 DIAGNOSIS — F41.0 PANIC ATTACK: ICD-10-CM

## 2024-04-24 RX ORDER — ALPRAZOLAM 0.25 MG/1
0.25 TABLET ORAL DAILY PRN
Qty: 30 TABLET | Refills: 0 | Status: SHIPPED | OUTPATIENT
Start: 2024-04-24

## 2024-04-24 NOTE — TELEPHONE ENCOUNTER
Caller: Sammi Setin    Relationship: Self    Best call back number: [phone]    Requested Prescriptions:   Requested Prescriptions     Pending Prescriptions Disp Refills    ALPRAZolam (XANAX) 0.25 MG tablet 30 tablet 0      Pharmacy where request should be sent: [facility] ONE [facility] AT PORTAL TO [facility] - [phone]  - [phone] FX     Last office visit with prescribing clinician: 10/18/2023   Last telemedicine visit with prescribing clinician: Visit date not found   Next office visit with prescribing clinician: 10/30/2024     Does the patient have less than a 3 day supply:  [x] Yes  [] No    Would you like a call back once the refill request has been completed: [] Yes [x] No      José Gil Rep   04/24/24 15:58 EDT

## 2024-06-05 ENCOUNTER — LAB (OUTPATIENT)
Dept: LAB | Facility: HOSPITAL | Age: 64
End: 2024-06-05
Payer: COMMERCIAL

## 2024-06-05 LAB
ANION GAP SERPL CALCULATED.3IONS-SCNC: 11.9 MMOL/L (ref 5–15)
BUN SERPL-MCNC: 11 MG/DL (ref 8–23)
BUN/CREAT SERPL: 19 (ref 7–25)
CALCIUM SPEC-SCNC: 9.6 MG/DL (ref 8.6–10.5)
CHLORIDE SERPL-SCNC: 101 MMOL/L (ref 98–107)
CO2 SERPL-SCNC: 25.1 MMOL/L (ref 22–29)
CREAT SERPL-MCNC: 0.58 MG/DL (ref 0.57–1)
EGFRCR SERPLBLD CKD-EPI 2021: 100.6 ML/MIN/1.73
GLUCOSE SERPL-MCNC: 113 MG/DL (ref 65–99)
POTASSIUM SERPL-SCNC: 3.6 MMOL/L (ref 3.5–5.2)
SODIUM SERPL-SCNC: 138 MMOL/L (ref 136–145)

## 2024-06-05 PROCEDURE — 80048 BASIC METABOLIC PNL TOTAL CA: CPT | Performed by: INTERNAL MEDICINE

## 2024-06-24 RX ORDER — ATORVASTATIN CALCIUM 20 MG/1
TABLET, FILM COATED ORAL
Qty: 90 TABLET | Refills: 3 | Status: SHIPPED | OUTPATIENT
Start: 2024-06-24

## 2024-07-26 DIAGNOSIS — F41.0 PANIC ATTACK: ICD-10-CM

## 2024-07-26 RX ORDER — ALPRAZOLAM 0.25 MG/1
0.25 TABLET ORAL DAILY PRN
Qty: 30 TABLET | Refills: 0 | Status: SHIPPED | OUTPATIENT
Start: 2024-07-26

## 2024-07-26 NOTE — TELEPHONE ENCOUNTER
Caller: Sammi Stein    Relationship: Self    Best call back number: [phone]    Requested Prescriptions:   Requested Prescriptions     Pending Prescriptions Disp Refills    ALPRAZolam (XANAX) 0.25 MG tablet 30 tablet 0     Sig: Take 1 tablet by mouth Daily As Needed for Anxiety.        Pharmacy where request should be sent: [facility] - ONE [facility] AT PORTAL TO [facility] - [phone]  - [phone] FX     Last office visit with prescribing clinician: 10/18/2023   Last telemedicine visit with prescribing clinician: Visit date not found   Next office visit with prescribing clinician: 10/30/2024     Additional details provided by patient:     Does the patient have less than a 3 day supply:  [] Yes  [x] No      José Justice Rep   07/26/24 12:02 EDT

## 2024-08-23 RX ORDER — HYOSCYAMINE SULFATE 0.125 MG
125 TABLET ORAL EVERY 6 HOURS PRN
Qty: 90 TABLET | Refills: 1 | Status: SHIPPED | OUTPATIENT
Start: 2024-08-23

## 2024-08-23 NOTE — TELEPHONE ENCOUNTER
Caller: Sammi Stein    Relationship: Self    Best call back number: [phone]     Requested Prescriptions:   Requested Prescriptions     Pending Prescriptions Disp Refills    hyoscyamine (ANASPAZ,LEVSIN) 0.125 MG tablet 30 tablet 0     Sig: Take 1 tablet by mouth Every 6 (Six) Hours As Needed for Cramping.        Pharmacy where request should be sent: [facility] - [facility] - ONE [facility] AT PORTAL TO [facility] - [phone]  - [phone]      Last office visit with prescribing clinician: 10/18/2023   Last telemedicine visit with prescribing clinician: Visit date not found   Next office visit with prescribing clinician: 10/30/2024     Additional details provided by patient: PATIENT HAS 1 TABLET LEFT OF MEDICATION     Does the patient have less than a 3 day supply:  [] Yes  [x] No    Would you like a call back once the refill request has been completed: [] Yes [x] No    If the office needs to give you a call back, can they leave a voicemail: [] Yes [x] No    José Trinidad Rep   08/23/24 11:48 EDT

## 2024-10-10 ENCOUNTER — TELEPHONE (OUTPATIENT)
Dept: INTERNAL MEDICINE | Facility: CLINIC | Age: 64
End: 2024-10-10
Payer: COMMERCIAL

## 2024-10-10 DIAGNOSIS — F41.0 PANIC ATTACK: ICD-10-CM

## 2024-10-10 NOTE — TELEPHONE ENCOUNTER
Caller: Sammi Stein    Relationship: Self    Best call back number: [phone]     Requested Prescriptions:   Requested Prescriptions     Pending Prescriptions Disp Refills    ALPRAZolam (XANAX) 0.25 MG tablet 30 tablet 0     Sig: Take 1 tablet by mouth Daily As Needed for Anxiety.        [facility] be [facility] - ONE [facility] AT PORTAL TO [facility] - [phone]  - [phone] FX     Last office visit with prescribing clinician: 10/18/2023   Last telemedicine visit with prescribing clinician: Visit date not found   Next office visit with prescribing clinician: 10/30/2024       Would you like a call back once the refill request has been completed: [] Yes [x] No    If the office needs to give you a call back, can they leave a voicemail: [] Yes [x] No    José Chen Rep   10/10/24 13:39 EDT

## 2024-10-10 NOTE — TELEPHONE ENCOUNTER
Patient called and stated that she is going to [facility] outpatient lab. Can you please put in CPE lab orders for [facility] outpatient lab.

## 2024-10-11 DIAGNOSIS — Z00.00 HEALTH MAINTENANCE EXAMINATION: Primary | ICD-10-CM

## 2024-10-11 RX ORDER — ALPRAZOLAM 0.25 MG
0.25 TABLET ORAL DAILY PRN
Qty: 30 TABLET | Refills: 0 | Status: SHIPPED | OUTPATIENT
Start: 2024-10-11

## 2024-10-17 ENCOUNTER — LAB (OUTPATIENT)
Dept: LAB | Facility: HOSPITAL | Age: 64
End: 2024-10-17
Payer: COMMERCIAL

## 2024-10-17 LAB
ALBUMIN SERPL-MCNC: 4.4 G/DL (ref 3.5–5.2)
ALBUMIN/GLOB SERPL: 1.6 G/DL
ALP SERPL-CCNC: 72 U/L (ref 39–117)
ALT SERPL W P-5'-P-CCNC: 34 U/L (ref 1–33)
AMORPH URATE CRY URNS QL MICRO: PRESENT /HPF
ANION GAP SERPL CALCULATED.3IONS-SCNC: 11.8 MMOL/L (ref 5–15)
AST SERPL-CCNC: 44 U/L (ref 1–32)
BACTERIA UR QL AUTO: NORMAL /HPF
BASOPHILS # BLD AUTO: 0.06 10*3/MM3 (ref 0–0.2)
BASOPHILS NFR BLD AUTO: 1 % (ref 0–1.5)
BILIRUB SERPL-MCNC: 0.9 MG/DL (ref 0–1.2)
BILIRUB UR QL STRIP: NEGATIVE
BUN SERPL-MCNC: 12 MG/DL (ref 8–23)
BUN/CREAT SERPL: 18.2 (ref 7–25)
CALCIUM SPEC-SCNC: 9.6 MG/DL (ref 8.6–10.5)
CHLORIDE SERPL-SCNC: 106 MMOL/L (ref 98–107)
CHOLEST SERPL-MCNC: 228 MG/DL (ref 0–200)
CLARITY UR: CLEAR
CO2 SERPL-SCNC: 26.2 MMOL/L (ref 22–29)
COLOR UR: ABNORMAL
CREAT SERPL-MCNC: 0.66 MG/DL (ref 0.57–1)
DEPRECATED RDW RBC AUTO: 45.6 FL (ref 37–54)
EGFRCR SERPLBLD CKD-EPI 2021: 96.9 ML/MIN/1.73
EOSINOPHIL # BLD AUTO: 0.11 10*3/MM3 (ref 0–0.4)
EOSINOPHIL NFR BLD AUTO: 1.8 % (ref 0.3–6.2)
ERYTHROCYTE [DISTWIDTH] IN BLOOD BY AUTOMATED COUNT: 12.3 % (ref 12.3–15.4)
GLOBULIN UR ELPH-MCNC: 2.7 GM/DL
GLUCOSE SERPL-MCNC: 102 MG/DL (ref 65–99)
GLUCOSE UR STRIP-MCNC: NEGATIVE MG/DL
HCT VFR BLD AUTO: 39.2 % (ref 34–46.6)
HDLC SERPL-MCNC: 109 MG/DL (ref 40–60)
HGB BLD-MCNC: 13.9 G/DL (ref 12–15.9)
HGB UR QL STRIP.AUTO: NEGATIVE
HYALINE CASTS UR QL AUTO: NORMAL /LPF
IMM GRANULOCYTES # BLD AUTO: 0.04 10*3/MM3 (ref 0–0.05)
IMM GRANULOCYTES NFR BLD AUTO: 0.7 % (ref 0–0.5)
KETONES UR QL STRIP: ABNORMAL
LDLC SERPL CALC-MCNC: 88 MG/DL (ref 0–100)
LDLC/HDLC SERPL: 0.74 {RATIO}
LEUKOCYTE ESTERASE UR QL STRIP.AUTO: ABNORMAL
LYMPHOCYTES # BLD AUTO: 2.71 10*3/MM3 (ref 0.7–3.1)
LYMPHOCYTES NFR BLD AUTO: 44.1 % (ref 19.6–45.3)
MCH RBC QN AUTO: 37.2 PG (ref 26.6–33)
MCHC RBC AUTO-ENTMCNC: 35.5 G/DL (ref 31.5–35.7)
MCV RBC AUTO: 104.8 FL (ref 79–97)
MONOCYTES # BLD AUTO: 0.54 10*3/MM3 (ref 0.1–0.9)
MONOCYTES NFR BLD AUTO: 8.8 % (ref 5–12)
NEUTROPHILS NFR BLD AUTO: 2.69 10*3/MM3 (ref 1.7–7)
NEUTROPHILS NFR BLD AUTO: 43.6 % (ref 42.7–76)
NITRITE UR QL STRIP: NEGATIVE
NRBC BLD AUTO-RTO: 0 /100 WBC (ref 0–0.2)
PH UR STRIP.AUTO: 5.5 [PH] (ref 5–8)
PLATELET # BLD AUTO: 174 10*3/MM3 (ref 140–450)
PMV BLD AUTO: 10.3 FL (ref 6–12)
POTASSIUM SERPL-SCNC: 4.2 MMOL/L (ref 3.5–5.2)
PROT SERPL-MCNC: 7.1 G/DL (ref 6–8.5)
PROT UR QL STRIP: NEGATIVE
RBC # BLD AUTO: 3.74 10*6/MM3 (ref 3.77–5.28)
RBC # UR STRIP: NORMAL /HPF
REF LAB TEST METHOD: NORMAL
SODIUM SERPL-SCNC: 144 MMOL/L (ref 136–145)
SP GR UR STRIP: 1.02 (ref 1–1.03)
SQUAMOUS #/AREA URNS HPF: NORMAL /HPF
TRIGL SERPL-MCNC: 194 MG/DL (ref 0–150)
TSH SERPL DL<=0.05 MIU/L-ACNC: 1.52 UIU/ML (ref 0.27–4.2)
UROBILINOGEN UR QL STRIP: ABNORMAL
VLDLC SERPL-MCNC: 31 MG/DL (ref 5–40)
WBC # UR STRIP: NORMAL /HPF
WBC NRBC COR # BLD AUTO: 6.15 10*3/MM3 (ref 3.4–10.8)

## 2024-10-17 PROCEDURE — 80053 COMPREHEN METABOLIC PANEL: CPT | Performed by: INTERNAL MEDICINE

## 2024-10-17 PROCEDURE — 80061 LIPID PANEL: CPT | Performed by: INTERNAL MEDICINE

## 2024-10-17 PROCEDURE — 81001 URINALYSIS AUTO W/SCOPE: CPT | Performed by: INTERNAL MEDICINE

## 2024-10-17 PROCEDURE — 84443 ASSAY THYROID STIM HORMONE: CPT | Performed by: INTERNAL MEDICINE

## 2024-10-17 PROCEDURE — 85025 COMPLETE CBC W/AUTO DIFF WBC: CPT | Performed by: INTERNAL MEDICINE

## 2024-10-30 ENCOUNTER — OFFICE VISIT (OUTPATIENT)
Dept: INTERNAL MEDICINE | Facility: CLINIC | Age: 64
End: 2024-10-30
Payer: COMMERCIAL

## 2024-10-30 VITALS
BODY MASS INDEX: 21.29 KG/M2 | HEART RATE: 96 BPM | WEIGHT: 124.7 LBS | SYSTOLIC BLOOD PRESSURE: 140 MMHG | DIASTOLIC BLOOD PRESSURE: 80 MMHG | TEMPERATURE: 98.6 F | OXYGEN SATURATION: 98 % | RESPIRATION RATE: 12 BRPM | HEIGHT: 64 IN

## 2024-10-30 DIAGNOSIS — Z00.00 WELL ADULT EXAM: Primary | ICD-10-CM

## 2024-10-30 DIAGNOSIS — Z78.0 MENOPAUSE: ICD-10-CM

## 2024-10-30 DIAGNOSIS — Z13.6 SCREENING FOR HEART DISEASE: ICD-10-CM

## 2024-10-30 PROBLEM — Z28.21 IMMUNIZATION REFUSED: Status: ACTIVE | Noted: 2024-10-30

## 2024-10-30 PROCEDURE — 99397 PER PM REEVAL EST PAT 65+ YR: CPT | Performed by: INTERNAL MEDICINE

## 2024-10-30 RX ORDER — TRAZODONE HYDROCHLORIDE 50 MG/1
TABLET, FILM COATED ORAL
Qty: 180 TABLET | Refills: 3 | Status: SHIPPED | OUTPATIENT
Start: 2024-10-30

## 2024-10-30 RX ORDER — SIMVASTATIN 20 MG
20 TABLET ORAL
COMMUNITY
End: 2024-10-30

## 2024-10-30 NOTE — LETTER
Controlled Substance Prescribing Agreement          I, Sammi Stein [PATIENT],  1960 [] a patient of  Christine Morris MD   [PROVIDER] at [facility] [PRACTICE], have been informed that  individuals who are prescribed certain Controlled Substances including, but not limited to, narcotic pain medicines, stimulants, benzodiazepine tranquilizers, and barbiturate sedatives, can abuse those substances or may allow abuse by others, and have some risk of developing an addictive disorder or suffering a relapse of a prior addiction. Therefore, I have been informed that it is necessary to observe strict rules pertaining to their use, and I agree to follow the terms and procedures described in this Agreement as consideration for, and as a condition of, the willingness of the physician whose signature appears below to consider prescribing or to continue prescribing Controlled Substances to treat my pain.     1. I will inform my physician of any current or past substance abuse, or any current or past substance abuse of any immediate member of my immediate family.     2. I agree that I may be subject to a voluntary evaluation by psychologists and/or psychiatrists, possibly at my own expense, before any Controlled Substances will be prescribed to me. I agree that the need to be evaluated by psychologists and/or psychiatrists may be revisited every three (3) to six (6) months thereafter while taking the medication.     3. All Controlled Substances must come from a provider in the PROVIDER’S PRACTICE. My Controlled Substances will come from the PROVIDER whose signature appears below, or during his or her absence, by the covering provider, unless specific written authorization is obtained from the office for an exception.     4. I will obtain all Controlled Substances from the same pharmacy. Should the need arise to change pharmacies, I will inform the PROVIDER’S office.     5. I will  inform the PROVIDER’S office of any new medications or medical conditions, and of any adverse effects I experience from any of the medications that I take.     6. I will inform my other health care providers that I am taking the Controlled Substances listed above, and of the existence of this Agreement. In the event of an emergency, I will provide the foregoing information to emergency department providers.     7. I agree that my prescribing PROVIDER has permission to discuss all diagnostic and treatment details with other health care providers, pharmacists, or other professionals who provide my health care regarding my use of Controlled Substances for purposes of maintaining accountability.     8. I will not allow anyone else to have, use sell, or otherwise have access to these medications. The sharing of medications with anyone is absolutely forbidden and is against the law.         9. I understand that Controlled Substances may be hazardous or lethal to a person who is not tolerant to their effects, especially a child, and that I must keep them out of reach of such people for their own safety.     10. I understand that tampering with a written prescription is a felony and I will not change or tamper with the PROVIDER’S written prescription.     11. I am aware that attempting to obtain a Controlled Substance under false pretenses is illegal.     12. I agree not to alter my medication in any way, and I will take my medication whole, and it will not be broken, chewed, crushed, injected, or snorted.     13. I will take my medication as instructed and prescribed, and I will not exceed the maximum prescribed dose. Any change in dosage must be approved by the PROVIDER or a physician within the PRACTICE.     14. I understand that these drugs should not be stopped abruptly, as withdrawal syndromes may develop.     15. I will cooperate with unannounced urine or serum toxicology screenings as may be requested, as well as  any random pill counts of medication by the PROVIDER. Failure to comply may result in termination of the PROVIDER-patient relationship.     16. I understand that the presence of unauthorized and/or illegal substances in the screenings described in the paragraph above may prompt referral for assessment for a substance abuse disorder or termination of the PROVIDER-patient relationship.     17. I understand that medications may not be replaced if they are lost, damaged, or stolen. If any of these situations arise that cause me to request an early refill of my medication, a copy of a filed police report or a statement from me explaining the circumstances may be required before additional prescriptions are considered. If I request an early refill secondary to lost, damaged, or stolen prescriptions twice within a year, I may be discharged from the practice.     18. I understand that a prescription may be given early if the PROVIDER or the patient will be out of town when the refill is due. These prescriptions will contain instructions to the pharmacist that the prescriptions(s) may not be filled prior to the appropriate date.     19. If the responsible legal authorities have questions concerning my treatment, as may occur, for example, if I obtained medication at several pharmacies, all confidentiality is waived, and these authorities may be given full access to my full records of Controlled Substances administration.     20. I will keep my scheduled appointments in order to receive medication renewals. If I need to cancel my appointment, I will do so a minimum of twenty-four (24) hours before it is scheduled.     21. I understand that I may be asked to bring my medications in their original container to the PROVIDER’s office while I am on controlled medication.     22. Refills generally will not be given over the phone, after office hours, during the weekends, and on holidays.     23. I understand that any medical  treatment is initially a trial, with the goal of treatment being to improve the quality of life and ability to function and/or work. These parameters will be assessed periodically to determine the benefits of continued therapy, and continued prescription is contingent on whether my physician believes that the medication usage benefits me. I will comply with all treatments as outlined by the PROVIDER.     24. I have been explained the risks and potential benefits of these therapies, including, but not limited to, psychological addiction, physical dependence, withdrawal and over dosage.     25. I understand that failure to adhere to these policies and/or failure to comply with the PROVIDER’S treatment plan may result in cessation of therapy with Controlled Substance prescribing by the PROVIDER or referral for further specialty assessment, as well as possible discharge from the PRACTICE.     26. I, the undersigned patient, attest that the foregoing was discussed with me, and that I have read, fully understand, and agree to all of the above requirements and instructions. I affirm that I have the full right and power to sign and be bound by this  Agreement.         Date:  __________________________________________    Time:  __________________________________________    Patient Printed Name:  _____________________________    Patient Signature:  ________________________________           Date:  __________________________________________    Time:  __________________________________________    Provider Signature:  _______________________________

## 2024-10-30 NOTE — PROGRESS NOTES
Estuardo Stein is a 64 y.o. female who presents for a complete physical exam.      History of Present Illness     The following data was reviewed by: Christine Morris MD on 10/30/2024:  Common labs          4/17/2024    10:51 6/5/2024    10:02 10/17/2024    10:57   Common Labs   Glucose 115  113  102    BUN 11  11  12    Creatinine 0.75  0.58  0.66    Sodium 137  138  144    Potassium 3.7  3.6  4.2    Chloride 101  101  106    Calcium 8.1  9.6  9.6    Albumin 4.3   4.4    Total Bilirubin 1.0   0.9    Alkaline Phosphatase 60   72    AST (SGOT) 23   44    ALT (SGPT) 19   34    WBC   6.15    Hemoglobin   13.9    Hematocrit   39.2    Platelets   174    Total Cholesterol 281   228    Triglycerides 133   194    HDL Cholesterol 107   109    LDL Cholesterol  152   88      HLD.  Control is good.   Chronic insomnia.  Controlled with trazodone.    Panic attacks.  Uses Xanax occasionally.        Review of Systems   Constitutional: Negative.    HENT: Negative.     Eyes: Negative.    Respiratory: Negative.     Cardiovascular: Negative.    Gastrointestinal: Negative.    Endocrine: Negative.    Genitourinary: Negative.    Musculoskeletal: Negative.    Skin: Negative.    Allergic/Immunologic: Negative.    Neurological: Negative.    Hematological: Negative.    Psychiatric/Behavioral: Negative.         The following portions of the patient's history were reviewed and updated as appropriate: allergies, current medications, past family history, past medical history, past social history, past surgical history and problem list.  Health maintenance tab was reviewed and updated with the patient.       Patient Active Problem List    Diagnosis Date Noted    Immunization refused 10/30/2024    Chronic insomnia 10/18/2023    Panic attack 01/22/2016     Note Last Updated: 8/16/2016     Description: Rare use of Xanax.  Contract on 8/16/2016      Irritable bowel 01/22/2016    HLD (hyperlipidemia) 01/22/2016    Gastroesophageal  reflux disease with esophagitis 01/22/2016       Past Medical History:   Diagnosis Date    Abnormal EKG     Anterior T wave changes at baseline    Abnormal liver function test     Acid reflux     Arthritis     Blood tests for routine general physical examination     Elevated cholesterol     Gastritis     Gastritis     GERD (gastroesophageal reflux disease)     Herpes labialis     Herpes labialis     Hyperlipidemia     IBS (irritable bowel syndrome)     Panic attack     Rare use of Xanax.  Contract on 8/6/14    SVT (supraventricular tachycardia)     Holter on 8/23/16 with several brief runs of SVT of up to 7 beats       Past Surgical History:   Procedure Laterality Date    BLADDER SURGERY      BREAST CYST ASPIRATION Right     about 20 years ago    CHOLECYSTECTOMY      COLONOSCOPY      SALPINGO OOPHORECTOMY      TOTAL ABDOMINAL HYSTERECTOMY  1992    done for fibroids    TOTAL ABDOMINAL HYSTERECTOMY WITH SALPINGO OOPHORECTOMY  1992       Family History   Problem Relation Age of Onset    Diabetes Father     Heart attack Father         Father with fatal MI at 76    Diabetes Sister         type 2, two sisters    Rectal cancer Brother     Rheumatic fever Brother     Diabetes Sister     Stroke Sister     Ovarian cancer Maternal Aunt     Heart attack Mother     Breast cancer Neg Hx        Social History     Socioeconomic History    Marital status:    Tobacco Use    Smoking status: Never    Smokeless tobacco: Never   Vaping Use    Vaping status: Never Used   Substance and Sexual Activity    Alcohol use: Yes     Comment: Socially     Drug use: No    Sexual activity: Yes     Partners: Male     Birth control/protection: Hysterectomy, Partner of same sex       Current Outpatient Medications on File Prior to Visit   Medication Sig Dispense Refill    ALPRAZolam (XANAX) 0.25 MG tablet Take 1 tablet by mouth Daily As Needed for Anxiety. 30 tablet 0    atorvastatin (LIPITOR) 20 MG tablet TAKE 1 TABLET DAILY (CANCELSIMVASTATIN)  "90 tablet 3    hyoscyamine (ANASPAZ,LEVSIN) 0.125 MG tablet Take 1 tablet by mouth Every 6 (Six) Hours As Needed for Cramping. 90 tablet 1    ibuprofen (ADVIL,MOTRIN) 600 MG tablet Take 1 tablet by mouth Every 6 (Six) Hours As Needed for Mild Pain.      Multiple Vitamins-Minerals (MULTIVITAMIN ADULT PO) Take  by mouth.      valACYclovir (VALTREX) 1000 MG tablet TAKE 1 TABLET BY MOUTH EVERY 12 HOURS. 240 tablet 1    [DISCONTINUED] simvastatin (ZOCOR) 20 MG tablet 1 tablet.      [DISCONTINUED] traZODone (DESYREL) 50 MG tablet TAKE 1 TABLET EVERY EVENING 90 tablet 3     No current facility-administered medications on file prior to visit.       Allergies   Allergen Reactions    Codeine Nausea Only       Immunization History   Administered Date(s) Administered    HPV Quadrivalent 10/02/2006, 12/29/2006    Hepatitis A 08/31/2018    Tdap 08/16/2016       Objective     /80 (BP Location: Left arm, Patient Position: Sitting, Cuff Size: Adult)   Pulse 96   Temp 98.6 °F (37 °C) (Oral)   Resp 12   Ht 162.6 cm (64\")   Wt 56.6 kg (124 lb 11.2 oz)   SpO2 98%   BMI 21.40 kg/m²     Physical Exam  Constitutional:       Appearance: She is well-developed.   HENT:      Head: Normocephalic and atraumatic.      Right Ear: Hearing, tympanic membrane and external ear normal.      Left Ear: Hearing, tympanic membrane and external ear normal.      Nose: Nose normal.   Neck:      Thyroid: No thyromegaly.   Cardiovascular:      Rate and Rhythm: Normal rate and regular rhythm.      Heart sounds: Normal heart sounds. No murmur heard.  Pulmonary:      Effort: Pulmonary effort is normal.      Breath sounds: Normal breath sounds.   Abdominal:      General: There is no distension.      Palpations: Abdomen is soft.      Tenderness: There is no abdominal tenderness.   Musculoskeletal:      Cervical back: Neck supple.   Lymphadenopathy:      Cervical: No cervical adenopathy.   Skin:     General: Skin is warm and dry.   Neurological:      " Mental Status: She is alert and oriented to person, place, and time.   Psychiatric:         Speech: Speech normal.         Behavior: Behavior normal.         Thought Content: Thought content normal.         Judgment: Judgment normal.         Assessment & Plan   Diagnoses and all orders for this visit:    1. Well adult exam (Primary)    2. Menopause  -     DEXA Bone Density Axial; Future    3. Screening for heart disease  -     CT Cardiac Calcium Score Without Dye; Future  -     Vascular Screening (Bundle) CAR; Future    Other orders  -     traZODone (DESYREL) 50 MG tablet; Take 1-2 tablets nightly prn  Dispense: 180 tablet; Refill: 3        Discussion    Patient presents today for a CPE.      Patient follows a healthy diet.   Patient follows an adequate exercise regimen. Patient will schedule a mammogram. Patient has been referred for colon cancer screening.   Pap smear no longer performed secondary to hysterectomy.   Discussed importance of preventative care including vaccinations, age appropriate cancer screening, routine lab work, healthy diet, and active lifestyle.  She declines immunizations.  Contract is reviewed.  Carmine is updated.        Health Maintenance   Topic Date Due    DXA SCAN  02/09/2017    COLORECTAL CANCER SCREENING  03/26/2020    ANNUAL PHYSICAL  10/18/2024    ZOSTER VACCINE (1 of 2) 10/30/2024 (Originally 7/15/2008)    COVID-19 Vaccine (1 - 2023-24 season) 11/01/2024 (Originally 9/1/2024)    INFLUENZA VACCINE  03/31/2025 (Originally 8/1/2024)    Pneumococcal Vaccine 65+ (1 of 1 - PCV) 10/30/2025 (Originally 7/15/2023)    LIPID PANEL  10/17/2025    MAMMOGRAM  11/09/2025    TDAP/TD VACCINES (2 - Td or Tdap) 08/16/2026    HEPATITIS C SCREENING  Completed    PAP SMEAR  Discontinued            Future Appointments   Date Time Provider Department Center   11/3/2025  3:10 PM [facility]   11/7/2025  1:30 PM Christine Morris MD [facility]

## 2024-11-22 ENCOUNTER — TRANSCRIBE ORDERS (OUTPATIENT)
Dept: ADMINISTRATIVE | Facility: HOSPITAL | Age: 64
End: 2024-11-22
Payer: COMMERCIAL

## 2024-11-22 DIAGNOSIS — Z12.31 SCREENING MAMMOGRAM FOR BREAST CANCER: Primary | ICD-10-CM

## 2024-12-06 DIAGNOSIS — F41.0 PANIC ATTACK: ICD-10-CM

## 2024-12-06 RX ORDER — ALPRAZOLAM 0.25 MG/1
0.25 TABLET ORAL DAILY PRN
Qty: 30 TABLET | Refills: 5 | Status: SHIPPED | OUTPATIENT
Start: 2024-12-06

## 2024-12-06 NOTE — TELEPHONE ENCOUNTER
Caller: Sammi Stein    Relationship: Self    Best call back number: [phone]     Requested Prescriptions:   Requested Prescriptions     Pending Prescriptions Disp Refills    ALPRAZolam (XANAX) 0.25 MG tablet 30 tablet 0     Sig: Take 1 tablet by mouth Daily As Needed for Anxiety.        [facility] - [address] [facility] AT [address]AL TO [facility] - [phone]  - [phone] FX     Last office visit with prescribing clinician: 10/30/2024   Last telemedicine visit with prescribing clinician: Visit date not found   Next office visit with prescribing clinician: 11/7/2025     Additional details provided by patient:     Does the patient have less than a 3 day supply:  [] Yes  [x] No    Would you like a call back once the refill request has been completed: [] Yes [x] No    If the office needs to give you a call back, can they leave a voicemail: [] Yes [x] No    José Trinidad Rep   12/06/24 11:55 EST

## 2024-12-20 RX ORDER — VALACYCLOVIR HYDROCHLORIDE 1 G/1
1000 TABLET, FILM COATED ORAL EVERY 12 HOURS
Qty: 180 TABLET | Refills: 1 | Status: SHIPPED | OUTPATIENT
Start: 2024-12-20

## 2024-12-20 RX ORDER — VALACYCLOVIR HYDROCHLORIDE 1 G/1
1000 TABLET, FILM COATED ORAL EVERY 12 HOURS
Qty: 240 TABLET | Refills: 1 | Status: CANCELLED | OUTPATIENT
Start: 2024-12-20

## 2024-12-20 NOTE — TELEPHONE ENCOUNTER
Caller: Sammi Stein    Relationship: Self    Best call back number: [phone]     Requested Prescriptions:   Requested Prescriptions     Pending Prescriptions Disp Refills    valACYclovir (VALTREX) 1000 MG tablet 240 tablet 1     Sig: Take 1 tablet by mouth Every 12 (Twelve) Hours.        Pharmacy where request should be sent: [facility] - [facility] - ONE [facility] AT PORTAL TO [facility] - [phone]  - [phone] FX     Last office visit with prescribing clinician: 10/30/2024   Last telemedicine visit with prescribing clinician: Visit date not found   Next office visit with prescribing clinician: 11/7/2025     Additional details provided by patient:     Does the patient have less than a 3 day supply:  [] Yes  [] No    Would you like a call back once the refill request has been completed: [] Yes [] No    If the office needs to give you a call back, can they leave a voicemail: [] Yes [x] No    José Trinidad Rep   12/20/24 15:32 EST

## 2025-01-14 RX ORDER — ATORVASTATIN CALCIUM 20 MG/1
20 TABLET, FILM COATED ORAL DAILY
Qty: 90 TABLET | Refills: 3 | Status: SHIPPED | OUTPATIENT
Start: 2025-01-14

## 2025-01-27 ENCOUNTER — HOSPITAL ENCOUNTER (OUTPATIENT)
Dept: MAMMOGRAPHY | Facility: HOSPITAL | Age: 65
Discharge: HOME | End: 2025-01-27
Admitting: INTERNAL MEDICINE
Payer: COMMERCIAL

## 2025-01-27 DIAGNOSIS — Z12.31 SCREENING MAMMOGRAM FOR BREAST CANCER: ICD-10-CM

## 2025-01-27 PROCEDURE — 77067 SCR MAMMO BI INCL CAD: CPT | Performed by: RADIOLOGY

## 2025-01-27 PROCEDURE — 77067 SCR MAMMO BI INCL CAD: CPT

## 2025-01-27 PROCEDURE — 77063 BREAST TOMOSYNTHESIS BI: CPT

## 2025-01-27 PROCEDURE — 77063 BREAST TOMOSYNTHESIS BI: CPT | Performed by: RADIOLOGY

## 2025-02-03 DIAGNOSIS — F41.0 PANIC ATTACK: ICD-10-CM

## 2025-02-03 RX ORDER — ALPRAZOLAM 0.25 MG/1
0.25 TABLET ORAL DAILY PRN
Qty: 30 TABLET | Refills: 5 | Status: SHIPPED | OUTPATIENT
Start: 2025-02-03

## 2025-04-14 DIAGNOSIS — F41.0 PANIC ATTACK: ICD-10-CM

## 2025-04-15 RX ORDER — ALPRAZOLAM 0.25 MG
0.25 TABLET ORAL DAILY PRN
Qty: 30 TABLET | Refills: 5 | Status: SHIPPED | OUTPATIENT
Start: 2025-04-15

## 2025-06-24 DIAGNOSIS — F41.0 PANIC ATTACK: ICD-10-CM

## 2025-06-24 RX ORDER — ALPRAZOLAM 0.25 MG
0.25 TABLET ORAL DAILY PRN
Qty: 30 TABLET | Refills: 5 | Status: SHIPPED | OUTPATIENT
Start: 2025-06-24

## 2025-06-24 NOTE — TELEPHONE ENCOUNTER
Caller: Sammi Stein S    Relationship: Self    Best call back number: [phone]     Requested Prescriptions:   Requested Prescriptions     Pending Prescriptions Disp Refills    ALPRAZolam (XANAX) 0.25 MG tablet 30 tablet 5     Sig: Take 1 tablet by mouth Daily As Needed for Anxiety.        Pharmacy where request should be sent: [facility] [id number] [address] - [phone] PH - [phone] FX     Last office visit with prescribing clinician: 10/30/2024   Last telemedicine visit with prescribing clinician: Visit date not found   Next office visit with prescribing clinician: 11/7/2025     Additional details provided by patient: OUT OF MEDICATION    PATIENT WANTS ALL OF HER SCRIPTS SENT TO THIS PHARMACY.  SHE IS NOT NEEDING REFILLS ON ANY OTHER MEDICATION AT THIS TIME    Does the patient have less than a 3 day supply:  [x] Yes  [] No    Would you like a call back once the refill request has been completed: [] Yes [] No    If the office needs to give you a call back, can they leave a voicemail: [] Yes [] No    José Velazquez Rep   06/24/25 08:36 EDT

## 2025-06-26 ENCOUNTER — APPOINTMENT (OUTPATIENT)
Dept: CT IMAGING | Facility: HOSPITAL | Age: 65
End: 2025-06-26
Payer: MEDICARE

## 2025-06-26 ENCOUNTER — OFFICE VISIT (OUTPATIENT)
Dept: INTERNAL MEDICINE | Facility: CLINIC | Age: 65
End: 2025-06-26
Payer: MEDICARE

## 2025-06-26 ENCOUNTER — HOSPITAL ENCOUNTER (EMERGENCY)
Facility: HOSPITAL | Age: 65
Discharge: HOME | End: 2025-06-26
Attending: EMERGENCY MEDICINE | Admitting: EMERGENCY MEDICINE
Payer: MEDICARE

## 2025-06-26 VITALS
WEIGHT: 128.85 LBS | TEMPERATURE: 97.6 F | HEART RATE: 110 BPM | SYSTOLIC BLOOD PRESSURE: 160 MMHG | OXYGEN SATURATION: 98 % | HEIGHT: 64 IN | BODY MASS INDEX: 22 KG/M2 | DIASTOLIC BLOOD PRESSURE: 82 MMHG

## 2025-06-26 VITALS
SYSTOLIC BLOOD PRESSURE: 171 MMHG | OXYGEN SATURATION: 98 % | RESPIRATION RATE: 16 BRPM | HEART RATE: 78 BPM | TEMPERATURE: 98.3 F | BODY MASS INDEX: 22.13 KG/M2 | DIASTOLIC BLOOD PRESSURE: 85 MMHG | WEIGHT: 129.63 LBS | HEIGHT: 64 IN

## 2025-06-26 DIAGNOSIS — K11.20 SIALADENITIS: Primary | ICD-10-CM

## 2025-06-26 DIAGNOSIS — R22.1 LOCALIZED SWELLING, MASS AND LUMP, NECK: Primary | ICD-10-CM

## 2025-06-26 DIAGNOSIS — R13.10 DYSPHAGIA, UNSPECIFIED TYPE: ICD-10-CM

## 2025-06-26 LAB
ALBUMIN SERPL-MCNC: 4.5 G/DL (ref 3.5–5.2)
ALBUMIN/GLOB SERPL: 1.5 G/DL
ALP SERPL-CCNC: 75 U/L (ref 39–117)
ALT SERPL W P-5'-P-CCNC: 26 U/L (ref 1–33)
ANION GAP SERPL CALCULATED.3IONS-SCNC: 12.3 MMOL/L (ref 5–15)
AST SERPL-CCNC: 40 U/L (ref 1–32)
BASOPHILS # BLD AUTO: 0.04 10*3/MM3 (ref 0–0.2)
BASOPHILS NFR BLD AUTO: 0.4 % (ref 0–1.5)
BILIRUB SERPL-MCNC: 1 MG/DL (ref 0–1.2)
BUN SERPL-MCNC: 14 MG/DL (ref 8–23)
BUN/CREAT SERPL: 20 (ref 7–25)
CALCIUM SPEC-SCNC: 9.9 MG/DL (ref 8.6–10.5)
CHLORIDE SERPL-SCNC: 101 MMOL/L (ref 98–107)
CO2 SERPL-SCNC: 25.7 MMOL/L (ref 22–29)
CREAT SERPL-MCNC: 0.7 MG/DL (ref 0.57–1)
DEPRECATED RDW RBC AUTO: 43.9 FL (ref 37–54)
EGFRCR SERPLBLD CKD-EPI 2021: 95.5 ML/MIN/1.73
EOSINOPHIL # BLD AUTO: 0.05 10*3/MM3 (ref 0–0.4)
EOSINOPHIL NFR BLD AUTO: 0.5 % (ref 0.3–6.2)
ERYTHROCYTE [DISTWIDTH] IN BLOOD BY AUTOMATED COUNT: 12.1 % (ref 12.3–15.4)
GLOBULIN UR ELPH-MCNC: 3 GM/DL
GLUCOSE SERPL-MCNC: 101 MG/DL (ref 65–99)
HCT VFR BLD AUTO: 39.3 % (ref 34–46.6)
HGB BLD-MCNC: 13.8 G/DL (ref 12–15.9)
HOLD SPECIMEN: NORMAL
HOLD SPECIMEN: NORMAL
IMM GRANULOCYTES # BLD AUTO: 0.04 10*3/MM3 (ref 0–0.05)
IMM GRANULOCYTES NFR BLD AUTO: 0.4 % (ref 0–0.5)
LYMPHOCYTES # BLD AUTO: 3.4 10*3/MM3 (ref 0.7–3.1)
LYMPHOCYTES NFR BLD AUTO: 34.8 % (ref 19.6–45.3)
MCH RBC QN AUTO: 35.7 PG (ref 26.6–33)
MCHC RBC AUTO-ENTMCNC: 35.1 G/DL (ref 31.5–35.7)
MCV RBC AUTO: 101.6 FL (ref 79–97)
MONOCYTES # BLD AUTO: 0.84 10*3/MM3 (ref 0.1–0.9)
MONOCYTES NFR BLD AUTO: 8.6 % (ref 5–12)
NEUTROPHILS NFR BLD AUTO: 5.41 10*3/MM3 (ref 1.7–7)
NEUTROPHILS NFR BLD AUTO: 55.3 % (ref 42.7–76)
NRBC BLD AUTO-RTO: 0 /100 WBC (ref 0–0.2)
PLATELET # BLD AUTO: 169 10*3/MM3 (ref 140–450)
PMV BLD AUTO: 9.5 FL (ref 6–12)
POTASSIUM SERPL-SCNC: 3.8 MMOL/L (ref 3.5–5.2)
PROT SERPL-MCNC: 7.5 G/DL (ref 6–8.5)
RBC # BLD AUTO: 3.87 10*6/MM3 (ref 3.77–5.28)
SODIUM SERPL-SCNC: 139 MMOL/L (ref 136–145)
WBC NRBC COR # BLD AUTO: 9.78 10*3/MM3 (ref 3.4–10.8)
WHOLE BLOOD HOLD COAG: NORMAL
WHOLE BLOOD HOLD SPECIMEN: NORMAL

## 2025-06-26 PROCEDURE — 36415 COLL VENOUS BLD VENIPUNCTURE: CPT

## 2025-06-26 PROCEDURE — 99285 EMERGENCY DEPT VISIT HI MDM: CPT

## 2025-06-26 PROCEDURE — 70491 CT SOFT TISSUE NECK W/DYE: CPT

## 2025-06-26 PROCEDURE — 1126F AMNT PAIN NOTED NONE PRSNT: CPT | Performed by: STUDENT IN AN ORGANIZED HEALTH CARE EDUCATION/TRAINING PROGRAM

## 2025-06-26 PROCEDURE — 80053 COMPREHEN METABOLIC PANEL: CPT

## 2025-06-26 PROCEDURE — 25510000001 IOPAMIDOL 61 % SOLUTION: Performed by: EMERGENCY MEDICINE

## 2025-06-26 PROCEDURE — 99214 OFFICE O/P EST MOD 30 MIN: CPT | Performed by: STUDENT IN AN ORGANIZED HEALTH CARE EDUCATION/TRAINING PROGRAM

## 2025-06-26 PROCEDURE — 85025 COMPLETE CBC W/AUTO DIFF WBC: CPT

## 2025-06-26 RX ORDER — IOPAMIDOL 612 MG/ML
100 INJECTION, SOLUTION INTRAVASCULAR
Status: COMPLETED | OUTPATIENT
Start: 2025-06-26 | End: 2025-06-26

## 2025-06-26 RX ADMIN — IOPAMIDOL 75 ML: 612 INJECTION, SOLUTION INTRAVENOUS at 18:46

## 2025-06-26 NOTE — ED PROVIDER NOTES
EMERGENCY DEPARTMENT ENCOUNTER    Room Number:  17/17  PCP: Christine Morris MD    HPI:  Chief Complaint: Neck swelling  A complete HPI/ROS/PMH/PSH/SH/FH are unobtainable due to: None  Context: Sammi Stein is a 64 y.o. female who presents to the ED c/o acute  That began Tuesday in the right submandibular region.  It is painful.  No difficulty breathing.  No fever.        PAST MEDICAL HISTORY  Active Ambulatory Problems     Diagnosis Date Noted    Panic attack 01/22/2016    Irritable bowel 01/22/2016    HLD (hyperlipidemia) 01/22/2016    Gastroesophageal reflux disease with esophagitis 01/22/2016    Chronic insomnia 10/18/2023    Immunization refused 10/30/2024     Resolved Ambulatory Problems     Diagnosis Date Noted    Abnormal LFTs 01/22/2016     Past Medical History:   Diagnosis Date    Abnormal EKG     Abnormal liver function test     Acid reflux     Arthritis     Blood tests for routine general physical examination     Elevated cholesterol     Gastritis     Gastritis     GERD (gastroesophageal reflux disease)     Herpes labialis     Herpes labialis     Hyperlipidemia     IBS (irritable bowel syndrome)     SVT (supraventricular tachycardia)          PAST SURGICAL HISTORY  Past Surgical History:   Procedure Laterality Date    BLADDER SURGERY      BREAST CYST ASPIRATION Right     about 20 years ago    CHOLECYSTECTOMY      COLONOSCOPY      SALPINGO OOPHORECTOMY      TOTAL ABDOMINAL HYSTERECTOMY  1992    done for fibroids    TOTAL ABDOMINAL HYSTERECTOMY WITH SALPINGO OOPHORECTOMY  1992         FAMILY HISTORY  Family History   Problem Relation Age of Onset    Diabetes Father     Heart attack Father         Father with fatal MI at 76    Diabetes Sister         type 2, two sisters    Rectal cancer Brother     Rheumatic fever Brother     Diabetes Sister     Stroke Sister     Ovarian cancer Maternal Aunt     Heart attack Mother     Breast cancer Neg Hx          SOCIAL HISTORY  Social History     Socioeconomic  History    Marital status:    Tobacco Use    Smoking status: Never    Smokeless tobacco: Never   Vaping Use    Vaping status: Never Used   Substance and Sexual Activity    Alcohol use: Yes     Comment: Socially     Drug use: No    Sexual activity: Yes     Partners: Male     Birth control/protection: Hysterectomy, Partner of same sex         ALLERGIES  Codeine        REVIEW OF SYSTEMS  Review of Systems     Included in HPI  All systems reviewed and negative except for those discussed in HPI.       PHYSICAL EXAM  ED Triage Vitals   Temp Heart Rate Resp BP SpO2   06/26/25 1621 06/26/25 1621 06/26/25 1621 06/26/25 1628 06/26/25 1621   98 °F (36.7 °C) 109 16 (!) 173/103 97 %      Temp src Heart Rate Source Patient Position BP Location FiO2 (%)   -- 06/26/25 1621 -- -- --    Monitor          Physical Exam      GENERAL: no acute distress  HENT: nares patent, right mandibular swelling, no overlying warmth or redness to the submandibular space, normal phonation, no trismus  EYES: no scleral icterus  CV: regular rhythm, normal rate  RESPIRATORY: normal effort  ABDOMEN: soft  MUSCULOSKELETAL: no deformity  NEURO: alert, moves all extremities, follows commands  PSYCH:  calm, cooperative  SKIN: warm, dry    Vital signs and nursing notes reviewed.          LAB RESULTS  Recent Results (from the past 24 hours)   Comprehensive Metabolic Panel    Collection Time: 06/26/25  4:31 PM    Specimen: Arm, Right; Blood   Result Value Ref Range    Glucose 101 (H) 65 - 99 mg/dL    BUN 14.0 8.0 - 23.0 mg/dL    Creatinine 0.70 0.57 - 1.00 mg/dL    Sodium 139 136 - 145 mmol/L    Potassium 3.8 3.5 - 5.2 mmol/L    Chloride 101 98 - 107 mmol/L    CO2 25.7 22.0 - 29.0 mmol/L    Calcium 9.9 8.6 - 10.5 mg/dL    Total Protein 7.5 6.0 - 8.5 g/dL    Albumin 4.5 3.5 - 5.2 g/dL    ALT (SGPT) 26 1 - 33 U/L    AST (SGOT) 40 (H) 1 - 32 U/L    Alkaline Phosphatase 75 39 - 117 U/L    Total Bilirubin 1.0 0.0 - 1.2 mg/dL    Globulin 3.0 gm/dL    A/G Ratio  1.5 g/dL    BUN/Creatinine Ratio 20.0 7.0 - 25.0    Anion Gap 12.3 5.0 - 15.0 mmol/L    eGFR 95.5 >60.0 mL/min/1.73   Green Top (Gel)    Collection Time: 06/26/25  4:31 PM   Result Value Ref Range    Extra Tube Hold for add-ons.    Lavender Top    Collection Time: 06/26/25  4:31 PM   Result Value Ref Range    Extra Tube hold for add-on    Gold Top - SST    Collection Time: 06/26/25  4:31 PM   Result Value Ref Range    Extra Tube Hold for add-ons.    Light Blue Top    Collection Time: 06/26/25  4:31 PM   Result Value Ref Range    Extra Tube Hold for add-ons.    CBC Auto Differential    Collection Time: 06/26/25  4:31 PM    Specimen: Arm, Right; Blood   Result Value Ref Range    WBC 9.78 3.40 - 10.80 10*3/mm3    RBC 3.87 3.77 - 5.28 10*6/mm3    Hemoglobin 13.8 12.0 - 15.9 g/dL    Hematocrit 39.3 34.0 - 46.6 %    .6 (H) 79.0 - 97.0 fL    MCH 35.7 (H) 26.6 - 33.0 pg    MCHC 35.1 31.5 - 35.7 g/dL    RDW 12.1 (L) 12.3 - 15.4 %    RDW-SD 43.9 37.0 - 54.0 fl    MPV 9.5 6.0 - 12.0 fL    Platelets 169 140 - 450 10*3/mm3    Neutrophil % 55.3 42.7 - 76.0 %    Lymphocyte % 34.8 19.6 - 45.3 %    Monocyte % 8.6 5.0 - 12.0 %    Eosinophil % 0.5 0.3 - 6.2 %    Basophil % 0.4 0.0 - 1.5 %    Immature Grans % 0.4 0.0 - 0.5 %    Neutrophils, Absolute 5.41 1.70 - 7.00 10*3/mm3    Lymphocytes, Absolute 3.40 (H) 0.70 - 3.10 10*3/mm3    Monocytes, Absolute 0.84 0.10 - 0.90 10*3/mm3    Eosinophils, Absolute 0.05 0.00 - 0.40 10*3/mm3    Basophils, Absolute 0.04 0.00 - 0.20 10*3/mm3    Immature Grans, Absolute 0.04 0.00 - 0.05 10*3/mm3    nRBC 0.0 0.0 - 0.2 /100 WBC       Ordered the above labs and reviewed the results.        RADIOLOGY  CT Soft Tissue Neck With Contrast  Result Date: 6/26/2025  CT NECK WITH CONTRAST  HISTORY: Neck pain and swelling.  COMPARISON: None.  FINDINGS: Radiopaque markers were placed over the area of concern. One marker overlies the right parotid gland and the second marker is in the submental region to the  right. Small markers are also identified anterior to the inferior aspect of the sternocleidomastoid muscles bilaterally.  There is mild enhancement involving the right submandibular gland with mild adjacent stranding. There is mild thickening of the platysmas muscle. An ovoid calculus involving the floor of the mouth is appreciated on the right measuring approximately 12 x 7 x 7 mm in size consistent with a distal submandibular duct stone. The parotid glands appear unremarkable. There is no evidence of airway compromise, abscess or pathologic adenopathy. Minor salivary gland calcification is noted on the right.      There is mild enhancement involving the right submandibular gland with adjacent stranding and thickening of the platysmas muscle consistent with sialoadenitis. A distal right submandibular ductal calculus is appreciated measuring 12 x 7 x 7 mm in size.  Radiation dose reduction techniques were utilized, including automated exposure control and exposure modulation based on body size.   This report was finalized on 6/26/2025 7:11 PM by Dr. Tayo Polanco M.D on Workstation: [id number]        Ordered the above noted radiological studies. Reviewed by me in PACS.        MEDICATIONS GIVEN IN ER  Medications   iopamidol (ISOVUE-300) 61 % injection 100 mL (75 mL Intravenous Given by Other 6/26/25 1597)         ORDERS PLACED DURING THIS VISIT:  Orders Placed This Encounter   Procedures    CT Soft Tissue Neck With Contrast    [place] Draw    Comprehensive Metabolic Panel    CBC Auto Differential    CBC & Differential    Green Top (Gel)    Lavender Top    Gold Top - SST    Light Blue Top         OUTPATIENT MEDICATION MANAGEMENT:  No current Epic-ordered facility-administered medications on file.     Current Outpatient Medications Ordered in Epic   Medication Sig Dispense Refill    traZODone (DESYREL) 50 MG tablet Take 1-2 tablets nightly prn 180 tablet 3    ALPRAZolam (XANAX) 0.25 MG tablet Take 1 tablet by  mouth Daily As Needed for Anxiety. 30 tablet 5    atorvastatin (LIPITOR) 20 MG tablet Take 1 tablet by mouth Daily. 90 tablet 3    hyoscyamine (ANASPAZ,LEVSIN) 0.125 MG tablet Take 1 tablet by mouth Every 6 (Six) Hours As Needed for Cramping. 90 tablet 1    ibuprofen (ADVIL,MOTRIN) 600 MG tablet Take 1 tablet by mouth Every 6 (Six) Hours As Needed for Mild Pain.      Multiple Vitamins-Minerals (MULTIVITAMIN ADULT PO) Take  by mouth.      valACYclovir (VALTREX) 1000 MG tablet Take 1 tablet by mouth Every 12 (Twelve) Hours. 180 tablet 1       PROCEDURES  Procedures          MEDICAL DECISION MAKING, PROGRESS, and CONSULTS    Discussion below represents my analysis of pertinent findings related to patient's condition, differential diagnosis, treatment plan and final disposition.      Additional sources:  - Discussed/obtained information from independent historians:  at bedside  Additional information was obtained to confirm the patient's history.          Differential diagnosis:    Malignancy, cellulitis, differential abscess, peritonsillar abscess, Bert's angina, Lemierre's syndrome, sialolithiasis/sialoadenitis               Independent interpretation of labs, radiology studies, and discussions with consultants:  ED Course as of 06/26/25 1957   Thu Jun 26, 2025 1944 CT imaging of the neck shows mild enhancement involving the right submandibular gland with adjacent stranding and thickening of the platysmas muscle consistent with sialoadenitis.  Distal right submandibular ductal calculus is appreciated. [TD]   1957 WBC: 9.78 [TD]   1957 Creatinine: 0.70 [TD]      ED Course User Index  [TD] Brandon Roberts II, MD       Discussed the results with the patient.  I discussed with her management including over-the-counter analgesics, massage, warm compresses, and sialagogues.  I discussed with her return precautions and indications for antibiotics should she develop fever, worsening pain, purulent drainage.  I  provided her ENT information for follow-up.          Clinical Scores:                                   DIAGNOSIS  Final diagnoses:   Sialadenitis         DISPOSITION  DISCHARGE    FOLLOW-UP  Christine Morris MD  [address] [address]  [phone]    Schedule an appointment as soon as possible for a visit in 1 day  As needed      Call [phone] to get an appointment scheduled with [facility]             Medication List      No changes were made to your prescriptions during this visit.             Latest Documented Vital Signs:  As of 19:57 EDT  BP- 163/80 HR- 78 Temp- 98 °F (36.7 °C) O2 sat- 98%      --    Please note that portions of this were completed with a voice recognition program.       Note Disclaimer: At [facility], we believe that sharing information builds trust and better relationships. You are receiving this note because you are receiving care at [facility] or recently visited. It is possible you will see health information before a provider has talked with you about it. This kind of information can be easy to misunderstand. To help you fully understand what it means for your health, we urge you to discuss this note with your provider.         Brandon Roberts II, MD  06/26/25 1957

## 2025-06-26 NOTE — PROGRESS NOTES
Nico Burdick DO, FACP  Internal Medicine  [facility]  [address], [address]  [place] [address]  [phone]    Chief Complaint  Swelling on right side notice on Tuesday.   SUBJECTIVE    History of Present Illness    Sammi Stein is a 64 y.o. female who presents to the office today as an established patient of Dr Christine Morris MD here today for an acute care visit.     States 2 days ago noticed a small (1-2 finger width) bump under the skin under the jawbone on the right and slowly enlarging toward the center of the neck. It hurts to touch.   She reports difficulty swallowing this morning while eating toast but no difficulty getting it down. She took 2 tylenol 500 mg . The throat doesn't hurt with opening the mouth but the mass itself does.   Now has tenderness under the right jawbone and tender to touch into the middle of the neck going down to the base of the neck.  No fever or chills. She has acid reflux but doesn't feel that she has a sore throat.     Allergies   Allergen Reactions    Codeine Nausea Only        Outpatient Medications Marked as Taking for the 6/26/25 encounter (Office Visit) with Nico Burdick DO   Medication Sig Dispense Refill    ALPRAZolam (XANAX) 0.25 MG tablet Take 1 tablet by mouth Daily As Needed for Anxiety. 30 tablet 5    atorvastatin (LIPITOR) 20 MG tablet Take 1 tablet by mouth Daily. 90 tablet 3    hyoscyamine (ANASPAZ,LEVSIN) 0.125 MG tablet Take 1 tablet by mouth Every 6 (Six) Hours As Needed for Cramping. 90 tablet 1    ibuprofen (ADVIL,MOTRIN) 600 MG tablet Take 1 tablet by mouth Every 6 (Six) Hours As Needed for Mild Pain.      Multiple Vitamins-Minerals (MULTIVITAMIN ADULT PO) Take  by mouth.      traZODone (DESYREL) 50 MG tablet Take 1-2 tablets nightly prn 180 tablet 3    valACYclovir (VALTREX) 1000 MG tablet Take 1 tablet by mouth Every 12 (Twelve) Hours. 180 tablet 1        Past Medical History:   Diagnosis Date    Abnormal EKG     Anterior  "T wave changes at baseline    Abnormal liver function test     Acid reflux     Arthritis     Blood tests for routine general physical examination     Elevated cholesterol     Gastritis     Gastritis     GERD (gastroesophageal reflux disease)     Herpes labialis     Herpes labialis     Hyperlipidemia     IBS (irritable bowel syndrome)     Panic attack     Rare use of Xanax.  Contract on 8/6/14    SVT (supraventricular tachycardia)     Holter on 8/23/16 with several brief runs of SVT of up to 7 beats       OBJECTIVE    Vital Signs:   /82   Pulse 110   Temp 97.6 °F (36.4 °C) (Infrared)   Ht 162.6 cm (64\")   Wt 58.4 kg (128 lb 13.6 oz)   SpO2 98%   BMI 22.12 kg/m²        Physical Exam  Vitals reviewed.   Constitutional:       General: She is not in acute distress.     Appearance: Normal appearance. She is normal weight. She is not ill-appearing.   HENT:      Right Ear: Tympanic membrane, ear canal and external ear normal. There is no impacted cerumen.      Left Ear: Tympanic membrane, ear canal and external ear normal. There is no impacted cerumen.      Mouth/Throat:      Mouth: Mucous membranes are moist.      Pharynx: Oropharynx is clear. No oropharyngeal exudate or posterior oropharyngeal erythema.      Comments: Overall narrow oral structure but with a tongue depressor exam there is no focal swelling visualized in the oropharynx.  Eyes:      General: No scleral icterus.  Neck:        Comments: In the encircled area under the right submandibular region there is visible enlargement that is tender to palpation and measuring 3 cm in width and 4.5 cm in length. It is hard in nature. there is no overlying skin change. Tenderness to palpation extends to the midline.    In the encircled area at the base of the neck there is a focal swelling that is cystic or fluid like in nature measuring 2 cm in width and 3 cm in length with associated TTP and no overlying skin change.  Pulmonary:      Effort: Pulmonary effort " is normal. No respiratory distress.   Lymphadenopathy:      Cervical: Cervical adenopathy present.   Skin:     Coloration: Skin is not jaundiced.   Neurological:      Mental Status: She is alert.   Psychiatric:         Mood and Affect: Mood normal.         Behavior: Behavior normal.         Thought Content: Thought content normal.                             ASSESSMENT & PLAN     Diagnoses and all orders for this visit:    1. Localized swelling, mass and lump, neck (Primary)  2. Dysphagia, unspecified type  -Patient presents to the office today for acute care visit as described above.  Overall she has had 2 days of subcutaneous nodule under the right submandibular region that has been slowly enlarging toward the center of her neck.  She states that there is now pain with palpation and this morning she had difficulty swallowing a piece of toast.  She has been taking Tylenol with no relief.  She does not have pain with opening and closing the mouth but the mass itself does hurt when opening and closing the mouth.  She has also noticed a swelling at the center of the neck at the base of the neck area.  She has had no systemic signs of illness but has had a sensation of acid reflux with no sore throat.  -Her exam is very concerning.In the right submandibular region there is visible enlargement and swelling that is tender to palpation with no overlying skin change measuring 3 cm in width and 4.5 cm in length.  It is in the shape of a lymph node and hard in nature.  There is tenderness palpation extending to the midline of the neck as well as slightly inferiorly.  At the base of the neck in the midline there is another more cystic or fluid like focal area of swelling measuring 2 cm in width and 3 cm in length with associated tenderness to palpation.  In the oropharynx, exam is difficult due to an overall narrow oral structure but with a gentle tongue depressor exam I do not appreciate any focal swelling or erythema on  either side.  -Advised patient that overall I am very concerned.  The differential for this is very large but most seriously would be a head and neck infection that could be a threat to her airway over time.  She is hypertensive on exam, slightly tachycardic and afebrile.  At this point we discussed ER evaluation versus stat outpatient evaluation.  I am okay sending her for stat labs and imaging if it can be obtained immediately at the hospital from our office.  I still think an inpatient stay is a high probability for her either way given the rapidly progressing nature of this lesion. She prefers to try to start the process as an outpatient. I advised her I will attempt to get stat labs as below today as well as a CT head/neck soft tissue stat today but if the CT cannot be obtained today I will recommend ER visit .  -     Comprehensive Metabolic Panel  -     CBC w AUTO Differential  -     Lactic Acid, Plasma        Follow Up  No follow-ups on file.    Patient/family had no further questions at this time and verbalized understanding of the plan discussed today.

## 2025-08-12 RX ORDER — ATORVASTATIN CALCIUM 20 MG/1
20 TABLET, FILM COATED ORAL DAILY
Qty: 90 TABLET | Refills: 3 | Status: SHIPPED | OUTPATIENT
Start: 2025-08-12